# Patient Record
Sex: MALE | Race: WHITE | Employment: OTHER | URBAN - NONMETROPOLITAN AREA
[De-identification: names, ages, dates, MRNs, and addresses within clinical notes are randomized per-mention and may not be internally consistent; named-entity substitution may affect disease eponyms.]

---

## 2017-09-19 LAB — PSA, ULTRASENSITIVE: 0.04 NG/ML

## 2017-09-28 ENCOUNTER — OFFICE VISIT (OUTPATIENT)
Dept: UROLOGY | Age: 72
End: 2017-09-28
Payer: MEDICARE

## 2017-09-28 VITALS
HEIGHT: 74 IN | BODY MASS INDEX: 23.1 KG/M2 | HEART RATE: 63 BPM | DIASTOLIC BLOOD PRESSURE: 90 MMHG | WEIGHT: 180 LBS | SYSTOLIC BLOOD PRESSURE: 177 MMHG

## 2017-09-28 DIAGNOSIS — C61 PROSTATE CANCER (HCC): Primary | ICD-10-CM

## 2017-09-28 DIAGNOSIS — R31.29 MICROSCOPIC HEMATURIA: ICD-10-CM

## 2017-09-28 LAB
BACTERIA: ABNORMAL /HPF
BILIRUBIN URINE: NEGATIVE
BILIRUBIN URINE: NEGATIVE
BLOOD URINE, POC: NORMAL
BLOOD, URINE: NEGATIVE
CASTS 2: ABNORMAL /LPF
CASTS UA: ABNORMAL /LPF
CHARACTER, URINE: ABNORMAL
CHARACTER, URINE: CLEAR
COLOR, URINE: YELLOW
COLOR: YELLOW
CRYSTALS, UA: ABNORMAL
EPITHELIAL CELLS, UA: ABNORMAL /HPF
GLUCOSE URINE: NEGATIVE MG/DL
GLUCOSE URINE: NEGATIVE MG/DL
KETONES, URINE: NEGATIVE
KETONES, URINE: NEGATIVE
LEUKOCYTE CLUMPS, URINE: NEGATIVE
LEUKOCYTE ESTERASE, URINE: NEGATIVE
MISCELLANEOUS 2: ABNORMAL
NITRITE, URINE: NEGATIVE
NITRITE, URINE: NEGATIVE
PH UA: 7.5
PH, URINE: 7.5
PROTEIN UA: NEGATIVE
PROTEIN, URINE: NEGATIVE MG/DL
RBC URINE: ABNORMAL /HPF
RENAL EPITHELIAL, UA: ABNORMAL
SPECIFIC GRAVITY, URINE: 1.02 (ref 1–1.03)
SPECIFIC GRAVITY, URINE: 1.02 (ref 1–1.03)
UROBILINOGEN, URINE: 0.2 EU/DL
UROBILINOGEN, URINE: 0.2 EU/DL
WBC UA: ABNORMAL /HPF
YEAST: ABNORMAL

## 2017-09-28 PROCEDURE — 3017F COLORECTAL CA SCREEN DOC REV: CPT | Performed by: PHYSICIAN ASSISTANT

## 2017-09-28 PROCEDURE — G8428 CUR MEDS NOT DOCUMENT: HCPCS | Performed by: PHYSICIAN ASSISTANT

## 2017-09-28 PROCEDURE — 81003 URINALYSIS AUTO W/O SCOPE: CPT | Performed by: PHYSICIAN ASSISTANT

## 2017-09-28 PROCEDURE — 4040F PNEUMOC VAC/ADMIN/RCVD: CPT | Performed by: PHYSICIAN ASSISTANT

## 2017-09-28 PROCEDURE — 99213 OFFICE O/P EST LOW 20 MIN: CPT | Performed by: PHYSICIAN ASSISTANT

## 2017-09-28 PROCEDURE — G8420 CALC BMI NORM PARAMETERS: HCPCS | Performed by: PHYSICIAN ASSISTANT

## 2017-09-28 PROCEDURE — 1036F TOBACCO NON-USER: CPT | Performed by: PHYSICIAN ASSISTANT

## 2017-09-28 PROCEDURE — 1123F ACP DISCUSS/DSCN MKR DOCD: CPT | Performed by: PHYSICIAN ASSISTANT

## 2017-10-04 ENCOUNTER — TELEPHONE (OUTPATIENT)
Dept: UROLOGY | Age: 72
End: 2017-10-04

## 2017-10-04 NOTE — TELEPHONE ENCOUNTER
Spoke to . Mandeep Pennie regarding atypical cells on recent urine cytology. We discussed the following work-up:    1. Office cystoscopy with Dr. Mark Lee  2. CT urogram to rule out upper tract pathology  3. Bladder Cx    Patient would like to discuss these options with his wife. He was instructed to call the office back and leave a message on how he would like to proceed. If he would like to go over these options once again, I would be more than happy to call him back to discuss this evaluation further.

## 2017-10-05 ENCOUNTER — TELEPHONE (OUTPATIENT)
Dept: UROLOGY | Age: 72
End: 2017-10-05

## 2017-10-05 DIAGNOSIS — R82.89 ABNORMAL URINE CYTOLOGY: Primary | ICD-10-CM

## 2017-10-06 DIAGNOSIS — R82.89 ABNORMAL URINE CYTOLOGY: Primary | ICD-10-CM

## 2017-10-06 NOTE — TELEPHONE ENCOUNTER
Ric was notified of the following appointments: 10/10/17 0930 nurse visit for bladder cx, [facility] outpatient express for CT urogram.  He was advised on NPO 4 hours prior. Then with Dr Julia Knox for office cysto 10/19/17 0830. Ric voiced understanding of all instructions.

## 2017-10-10 ENCOUNTER — NURSE ONLY (OUTPATIENT)
Dept: UROLOGY | Age: 72
End: 2017-10-10

## 2017-10-10 ENCOUNTER — HOSPITAL ENCOUNTER (OUTPATIENT)
Dept: CT IMAGING | Age: 72
Discharge: HOME | End: 2017-10-10
Payer: MEDICARE

## 2017-10-10 DIAGNOSIS — R82.89 ABNORMAL URINE CYTOLOGY: ICD-10-CM

## 2017-10-10 DIAGNOSIS — R89.6 ABNORMAL CYTOLOGY: Primary | ICD-10-CM

## 2017-10-10 LAB — POC CREATININE WHOLE BLOOD: 0.8 MG/DL (ref 0.5–1.2)

## 2017-10-10 PROCEDURE — 6360000004 HC RX CONTRAST MEDICATION: Performed by: PHYSICIAN ASSISTANT

## 2017-10-10 PROCEDURE — 82565 ASSAY OF CREATININE: CPT

## 2017-10-10 PROCEDURE — 74178 CT ABD&PLV WO CNTR FLWD CNTR: CPT

## 2017-10-10 PROCEDURE — 99999 PR OFFICE/OUTPT VISIT,PROCEDURE ONLY: CPT | Performed by: NURSE PRACTITIONER

## 2017-10-10 RX ADMIN — IOPAMIDOL 85 ML: 755 INJECTION, SOLUTION INTRAVENOUS at 10:57

## 2017-10-10 NOTE — PROGRESS NOTES
Patient presents today to collect a urine sample to sendout for the Bladder Cx per [facility].

## 2017-10-10 NOTE — PROGRESS NOTES
I have personally verified, reviewed, released, signed, authenticated, authorized, confirmed,finalized, and approved the actions of the CMA.

## 2017-10-19 ENCOUNTER — PROCEDURE VISIT (OUTPATIENT)
Dept: UROLOGY | Age: 72
End: 2017-10-19
Payer: MEDICARE

## 2017-10-19 VITALS — DIASTOLIC BLOOD PRESSURE: 90 MMHG | SYSTOLIC BLOOD PRESSURE: 154 MMHG | WEIGHT: 197 LBS | BODY MASS INDEX: 25.29 KG/M2

## 2017-10-19 DIAGNOSIS — R31.9 HEMATURIA, UNSPECIFIED TYPE: Primary | ICD-10-CM

## 2017-10-19 LAB
BILIRUBIN URINE: NEGATIVE
BLOOD URINE, POC: NORMAL
CHARACTER, URINE: CLEAR
COLOR, URINE: YELLOW
GLUCOSE URINE: NEGATIVE MG/DL
KETONES, URINE: NEGATIVE
LEUKOCYTE CLUMPS, URINE: NEGATIVE
NITRITE, URINE: NEGATIVE
PH, URINE: 8.5
PROTEIN, URINE: NEGATIVE MG/DL
SPECIFIC GRAVITY, URINE: 1.02 (ref 1–1.03)
UROBILINOGEN, URINE: 0.2 EU/DL

## 2017-10-19 PROCEDURE — 81003 URINALYSIS AUTO W/O SCOPE: CPT | Performed by: UROLOGY

## 2017-10-19 PROCEDURE — 99999 PR OFFICE/OUTPT VISIT,PROCEDURE ONLY: CPT | Performed by: UROLOGY

## 2017-10-19 PROCEDURE — 52000 CYSTOURETHROSCOPY: CPT | Performed by: UROLOGY

## 2017-10-19 NOTE — PROGRESS NOTES
Mr. Alber Jennings was seen in follow up for cystoscopy as per plan developed by RASHIDA Kennedy. Cystoscopy was performed without difficulty and it was well tolerated. Past Medical History:   Diagnosis Date    Arthritis     ankles and knees    Cancer [facility])     prostate    Enlarged prostate     Epididymal cyst 2013    Hyperlipidemia     Prostate nodule        Past Surgical History:   Procedure Laterality Date    ABDOMEN SURGERY      CATARACT REMOVAL      COLONOSCOPY      EYE SURGERY      detatched retina repaired right side;bilateral cateracts    PROSTATE SURGERY  09/14/2012 Dr Prosper Espinal       Current Outpatient Prescriptions on File Prior to Visit   Medication Sig Dispense Refill    latanoprost (XALATAN) 0.005 % ophthalmic solution 1 drop nightly      Lutein 6 MG CAPS Take by mouth      Vitamin D (CHOLECALCIFEROL) 1000 UNITS CAPS capsule Take 1,000 Units by mouth daily      simvastatin (ZOCOR) 20 MG tablet Take 20 mg by mouth nightly. No current facility-administered medications on file prior to visit. No Known Allergies    Family History   Problem Relation Age of Onset    Heart Disease Mother     Heart Disease Father     Stroke Father     Prostate Cancer Maternal Grandfather 80       Social History     Social History    Marital status:      Spouse name: N/A    Number of children: N/A    Years of education: N/A     Occupational History    Not on file.      Social History Main Topics    Smoking status: Former Smoker     Years: 4.00     Types: Cigarettes     Quit date: 9/1/1968    Smokeless tobacco: Never Used      Comment: quit in 1968    Alcohol use No    Drug use: No    Sexual activity: Not on file     Other Topics Concern    Not on file     Social History Narrative    No narrative on file       Review of Systems  No problems with ears, nose or throat. No problems with eyes. No chest pain, shortness of breath, abdominal pain, extremity pain or weakness, and no neurological deficits. No rashes. No swollen glands or lymph nodes.  symptoms per HPI. The remainder of the review of symptoms is negative. Exam  General: alert and oriented. Cooperative. HENT: Normocephalic, Atraumatic. Eyes: No scleral icterus, mucous membranes moist.  Respiratory: Effort normal.   Skin: No rashes or obvious lesions. Labs    Results for POC orders placed in visit on 10/19/17   POCT Urinalysis No Micro (Auto)   Result Value Ref Range    Glucose, Ur Negative NEGATIVE mg/dl    Bilirubin Urine Negative     Ketones, Urine Negative NEGATIVE    Specific Gravity, Urine 1.020 1.002 - 1.03    Blood, UA POC Small NEGATIVE    pH, Urine 8.50 5.0 - 9.0    Protein, Urine Negative NEGATIVE mg/dl    Urobilinogen, Urine 0.20 0.0 - 1.0 eu/dl    Nitrite, Urine Negative NEGATIVE    Leukocyte Clumps, Urine Negative NEGATIVE    Color, Urine Yellow YELLOW-STR    Character, Urine Clear [id number].[email or web address]       Lab Results   Component Value Date    CREATININE 0.9 07/06/2013    BUN 9 07/06/2013     07/06/2013    K 4.0 07/06/2013     07/06/2013    CO2 30 07/06/2013       Lab Results   Component Value Date    PSA <0.02 09/23/2015       PATHOLOGY REPORT                      ATTN: DONNA BEASLEY                      REQ: DONNA BEASLEY        Clinical Information:  MICROSCOPIC HEMATURIA    SOURCE:  A) URINE, METHOD OF COLLECTION UNKNOWN    Source Description:                 Materials Prepared & Examined:  Volume. ............ 30 ml           Monolayers. ............... Nissa Bending 1  Color. .......... Yellow  Consistency. .. Nissa Bending [place] Bending Nissa Bending Thin  Other. ................ Cloudy        FINAL RESULTS:     Atypical urothelial cells present.       Blood. Radiology  The patient has had a CT urogram that I have reviewed along with its accompanying report.   The study demonstrates some kidney stones

## 2018-01-18 ENCOUNTER — HOSPITAL ENCOUNTER (OUTPATIENT)
Age: 73
Discharge: HOME | End: 2018-01-18
Payer: MEDICARE

## 2018-01-18 DIAGNOSIS — R31.9 HEMATURIA, UNSPECIFIED TYPE: ICD-10-CM

## 2018-01-18 PROCEDURE — 88112 CYTOPATH CELL ENHANCE TECH: CPT

## 2018-09-18 LAB — PSA, ULTRASENSITIVE: <0.014 NG/ML

## 2018-09-27 ENCOUNTER — OFFICE VISIT (OUTPATIENT)
Dept: UROLOGY | Age: 73
End: 2018-09-27
Payer: MEDICARE

## 2018-09-27 VITALS
WEIGHT: 193 LBS | BODY MASS INDEX: 24.77 KG/M2 | HEIGHT: 74 IN | DIASTOLIC BLOOD PRESSURE: 88 MMHG | SYSTOLIC BLOOD PRESSURE: 138 MMHG

## 2018-09-27 DIAGNOSIS — C61 PROSTATE CANCER (HCC): Primary | ICD-10-CM

## 2018-09-27 DIAGNOSIS — N20.0 NEPHROLITHIASIS: ICD-10-CM

## 2018-09-27 PROCEDURE — 3017F COLORECTAL CA SCREEN DOC REV: CPT | Performed by: PHYSICIAN ASSISTANT

## 2018-09-27 PROCEDURE — 1123F ACP DISCUSS/DSCN MKR DOCD: CPT | Performed by: PHYSICIAN ASSISTANT

## 2018-09-27 PROCEDURE — 4040F PNEUMOC VAC/ADMIN/RCVD: CPT | Performed by: PHYSICIAN ASSISTANT

## 2018-09-27 PROCEDURE — G8427 DOCREV CUR MEDS BY ELIG CLIN: HCPCS | Performed by: PHYSICIAN ASSISTANT

## 2018-09-27 PROCEDURE — 99213 OFFICE O/P EST LOW 20 MIN: CPT | Performed by: PHYSICIAN ASSISTANT

## 2018-09-27 PROCEDURE — 1101F PT FALLS ASSESS-DOCD LE1/YR: CPT | Performed by: PHYSICIAN ASSISTANT

## 2018-09-27 PROCEDURE — 1036F TOBACCO NON-USER: CPT | Performed by: PHYSICIAN ASSISTANT

## 2018-09-27 PROCEDURE — G8420 CALC BMI NORM PARAMETERS: HCPCS | Performed by: PHYSICIAN ASSISTANT

## 2018-09-27 PROCEDURE — 81003 URINALYSIS AUTO W/O SCOPE: CPT | Performed by: PHYSICIAN ASSISTANT

## 2018-09-27 RX ORDER — LORATADINE 10 MG/1
10 CAPSULE, LIQUID FILLED ORAL PRN
COMMUNITY
End: 2020-10-12

## 2018-09-28 ENCOUNTER — TELEPHONE (OUTPATIENT)
Dept: UROLOGY | Age: 73
End: 2018-09-28

## 2018-09-28 DIAGNOSIS — R31.29 MICROSCOPIC HEMATURIA: ICD-10-CM

## 2018-09-28 DIAGNOSIS — R89.6 ABNORMAL CYTOLOGY: Primary | ICD-10-CM

## 2018-09-28 NOTE — PROGRESS NOTES
I have reviewed the patients chart and Hardy Medina has discussed relevant portions with me. I agree with the assessment and plan.

## 2018-10-05 ENCOUNTER — TELEPHONE (OUTPATIENT)
Dept: UROLOGY | Age: 73
End: 2018-10-05

## 2018-10-10 NOTE — TELEPHONE ENCOUNTER
Patient called asking the results of his testing-I relayed Tanvi's message and asked if I could schedule him for an appointment with Dr Symone Dunlap. He declined and said he would get back to us on this. He said he had a cystoscopy last year, and I told him that it would still be a good idea to have it checked out with his family history and all. He still said he would get back with us.

## 2019-09-24 LAB — PROSTATE SPECIFIC ANTIGEN: NORMAL NG/ML

## 2019-10-03 ENCOUNTER — OFFICE VISIT (OUTPATIENT)
Dept: UROLOGY | Age: 74
End: 2019-10-03
Payer: MEDICARE

## 2019-10-03 ENCOUNTER — TELEPHONE (OUTPATIENT)
Dept: UROLOGY | Age: 74
End: 2019-10-03

## 2019-10-03 VITALS
DIASTOLIC BLOOD PRESSURE: 82 MMHG | HEIGHT: 72 IN | WEIGHT: 187.9 LBS | SYSTOLIC BLOOD PRESSURE: 138 MMHG | BODY MASS INDEX: 25.45 KG/M2

## 2019-10-03 DIAGNOSIS — R89.6 ABNORMAL CYTOLOGY: ICD-10-CM

## 2019-10-03 DIAGNOSIS — C61 PROSTATE CANCER (HCC): Primary | ICD-10-CM

## 2019-10-03 DIAGNOSIS — R31.29 MICROSCOPIC HEMATURIA: ICD-10-CM

## 2019-10-03 LAB
BACTERIA: ABNORMAL /HPF
BILIRUBIN URINE: NEGATIVE
BILIRUBIN URINE: NEGATIVE
BLOOD URINE, POC: ABNORMAL
BLOOD, URINE: ABNORMAL
CASTS 2: ABNORMAL /LPF
CASTS UA: ABNORMAL /LPF
CHARACTER, URINE: CLEAR
CHARACTER, URINE: CLEAR
COLOR, URINE: YELLOW
COLOR: YELLOW
CRYSTALS, UA: ABNORMAL
EPITHELIAL CELLS, UA: ABNORMAL /HPF
GLUCOSE URINE: NEGATIVE MG/DL
GLUCOSE URINE: NEGATIVE MG/DL
KETONES, URINE: NEGATIVE
KETONES, URINE: NEGATIVE
LEUKOCYTE CLUMPS, URINE: NEGATIVE
LEUKOCYTE ESTERASE, URINE: NEGATIVE
MISCELLANEOUS 2: ABNORMAL
NITRITE, URINE: NEGATIVE
NITRITE, URINE: NEGATIVE
PH UA: 5.5 (ref 5–9)
PH, URINE: 6 (ref 5–9)
PROTEIN UA: NEGATIVE
PROTEIN, URINE: NEGATIVE MG/DL
RBC URINE: ABNORMAL /HPF
RENAL EPITHELIAL, UA: ABNORMAL
SPECIFIC GRAVITY, URINE: 1.02 (ref 1–1.03)
SPECIFIC GRAVITY, URINE: 1.02 (ref 1–1.03)
UROBILINOGEN, URINE: 0.2 EU/DL (ref 0–1)
UROBILINOGEN, URINE: 0.2 EU/DL (ref 0–1)
WBC UA: ABNORMAL /HPF
YEAST: ABNORMAL

## 2019-10-03 PROCEDURE — 4040F PNEUMOC VAC/ADMIN/RCVD: CPT | Performed by: PHYSICIAN ASSISTANT

## 2019-10-03 PROCEDURE — G8484 FLU IMMUNIZE NO ADMIN: HCPCS | Performed by: PHYSICIAN ASSISTANT

## 2019-10-03 PROCEDURE — 81003 URINALYSIS AUTO W/O SCOPE: CPT | Performed by: PHYSICIAN ASSISTANT

## 2019-10-03 PROCEDURE — G8419 CALC BMI OUT NRM PARAM NOF/U: HCPCS | Performed by: PHYSICIAN ASSISTANT

## 2019-10-03 PROCEDURE — 99213 OFFICE O/P EST LOW 20 MIN: CPT | Performed by: PHYSICIAN ASSISTANT

## 2019-10-03 PROCEDURE — 1036F TOBACCO NON-USER: CPT | Performed by: PHYSICIAN ASSISTANT

## 2019-10-03 PROCEDURE — G8427 DOCREV CUR MEDS BY ELIG CLIN: HCPCS | Performed by: PHYSICIAN ASSISTANT

## 2019-10-03 PROCEDURE — 1123F ACP DISCUSS/DSCN MKR DOCD: CPT | Performed by: PHYSICIAN ASSISTANT

## 2019-10-03 RX ORDER — LISINOPRIL 10 MG/1
TABLET ORAL
COMMUNITY
Start: 2019-08-07

## 2019-10-15 ENCOUNTER — TELEPHONE (OUTPATIENT)
Dept: UROLOGY | Age: 74
End: 2019-10-15

## 2019-10-15 ENCOUNTER — NURSE ONLY (OUTPATIENT)
Dept: UROLOGY | Age: 74
End: 2019-10-15

## 2019-10-30 ENCOUNTER — TELEPHONE (OUTPATIENT)
Dept: UROLOGY | Age: 74
End: 2019-10-30

## 2020-09-24 LAB — PROSTATE SPECIFIC ANTIGEN: NORMAL

## 2020-10-11 NOTE — PROGRESS NOTES
Mr. Jane Chaudhry is a 58-year-old male status post RALRP with Right Pelvic Lymph Node Dissection on on 9/14/12. His pathology was significant for Alston's 3+3=6 Stage [id number] Prostate Cancer. He has rare episodes of scant stress incontinence but no voiding difficulties, gross hematuria, or dysuria. He performs daily pelvic floor therapy. He did have atypical cells on urine cytology in 9/17. His Bladder Cx revealed a mildly elevated level of 0.15. He underwent office cystoscopy with Dr. Alli Jade in October 2017. No abnormal findings were demonstrated. CT scan (9/17) revealed bilateral small stones. The distal most part of the right ureter was not visualized. Otherwise, he denies bone pain, weight loss or constitutional symptoms. His PSA levels have consistantly ranged from 0.01-0.05. He denies any general medical concerns such as chest pain, shortness of breath, N/V, calf pain, HA, lightheadedness or fever/chills.          Past Medical History:   Diagnosis Date    Arthritis     ankles and knees    Cancer [facility])     prostate    Enlarged prostate     Epididymal cyst 2013    Hyperlipidemia     Prostate nodule        Past Surgical History:   Procedure Laterality Date    ABDOMEN SURGERY      CATARACT REMOVAL      COLONOSCOPY      EYE SURGERY      detatched retina repaired right side;bilateral cateracts    KNEE SURGERY Right 07/2017    Torn Meniscus     PROSTATE SURGERY  09/14/2012 Dr Radha Torres       Current Outpatient Medications on File Prior to Visit   Medication Sig Dispense Refill    lisinopril (PRINIVIL;ZESTRIL) 10 MG tablet       latanoprost (XALATAN) 0.005 % ophthalmic solution 1 drop nightly      Lutein 6 MG CAPS Take by mouth      Vitamin D (CHOLECALCIFEROL) 1000 UNITS CAPS capsule Take 1,000 Units by mouth daily      simvastatin (ZOCOR) 20 MG tablet Take 20 mg by mouth nightly. No current facility-administered medications on file prior to visit. No Known Allergies    Family History   Problem Relation Age of Onset    Heart Disease Mother     Heart Disease Father     Stroke Father     Prostate Cancer Maternal Grandfather 80       Social History     Socioeconomic History    Marital status:      Spouse name: Not on file    Number of children: Not on file    Years of education: Not on file    Highest education level: Not on file   Occupational History    Not on file   Social Needs    Financial resource strain: Not on file    Food insecurity     Worry: Not on file     Inability: Not on file    Transportation needs     Medical: Not on file     Non-medical: Not on file   Tobacco Use    Smoking status: Former Smoker     Packs/day: 1.00     Years: 4.00     Pack years: 4.00     Types: Cigarettes     Last attempt to quit: 1968     Years since quittin.1    Smokeless tobacco: Never Used    Tobacco comment: quit in    Substance and Sexual Activity    Alcohol use: No    Drug use: No    Sexual activity: Not on file   Lifestyle    Physical activity     Days per week: Not on file     Minutes per session: Not on file    Stress: Not on file   Relationships    Social connections     Talks on phone: Not on file     Gets together: Not on file     Attends Buddhist service: Not on file     Active member of club or organization: Not on file     Attends meetings of clubs or organizations: Not on file     Relationship status: Not on file    Intimate partner violence     Fear of current or ex partner: Not on file     Emotionally abused: Not on file     Physically abused: Not on file     Forced sexual activity: Not on file   Other Topics Concern    Not on file   Social History Narrative    Not on file       Review of Systems  No problems with ears, nose or throat. No problems with eyes.   No chest pain, shortness of breath, abdominal pain, extremity pain or weakness, and no neurological deficits. No rashes. No swollen glands or lymph nodes.  symptoms per HPI. The remainder of the review of symptoms is negative. Exam    Temp 97.2 °F (36.2 °C) (Temporal)   Ht 6' (1.829 m)   Wt 182 lb (82.6 kg)   BMI 24.68 kg/m²     Constitutional: Oriented to person, place, and time. Vital signs are normal. Appears well-developed and well-nourished. Cooperative. No distress. HENT:    Head: Normocephalic and atraumatic.    Eyes: EOM are normal. Pupils are equal, round, and reactive to light. Right eye exhibits no discharge. Left eye exhibits no discharge. No scleral icterus. Neck: Trachea normal.    Cardiovascular: Normal rate and regular rhythm. S1 and S2 normal.  Pulmonary/Chest: Effort normal. Clear bilaterally without rales, rhonchi, wheezes. No respiratory distress. Abdominal: Soft. Exhibits no distension or tenderness. There is no rebound and no CVA tenderness. Musculoskeletal: No edema or tenderness. Lymphadenopathy: No supraclavicular or superficial cervical lymphadenopathy noted. Neurological: Alert and oriented to person, place, and time. No cranial nerve deficit. Skin: Skin is warm and dry. Not diaphoretic. Psychiatric: Normal mood and affect. Behavior is normal.   Nursing note and vitals reviewed.       Labs    Results for POC orders placed in visit on 10/12/20   POCT Urinalysis No Micro (Auto)   Result Value Ref Range    Glucose, Ur Negative NEGATIVE mg/dl    Bilirubin Urine Negative     Ketones, Urine Negative NEGATIVE    Specific Gravity, Urine 1.020 1.002 - 1.030    Blood, UA POC Moderate (A) NEGATIVE    pH, Urine 6.00 5.0 - 9.0    Protein, Urine Negative NEGATIVE mg/dl    Urobilinogen, Urine 0.20 0.0 - 1.0 eu/dl    Nitrite, Urine Negative NEGATIVE    Leukocyte Clumps, Urine Negative NEGATIVE    Color, Urine Yellow YELLOW-STRAW    Character, Urine Clear CLR-SL.CLOUD       Lab Results   Component Value Date    CREATININE 0.9 07/06/2013 BUN 9 07/06/2013     07/06/2013    K 4.0 07/06/2013     07/06/2013    CO2 30 07/06/2013       Lab Results   Component Value Date    PSA  09/24/2020      Comment:      <0.01    PSA  09/24/2019      Comment:      <0.01    PSA <0.02 09/23/2015         Plan:  1. Prostate cancer- Status post RALRP with Right Pelvic Lymph Node Dissection on on 9/14/12. His pathology was significant for Lamar's 3+3=6 Stage [id number] Prostate Cancer. PSA levels have ranged from 0.01-0.05. His most recent PSA in September 2020 was <0.01. Will continue with yearly PSA levels. Call with any concerns in the meantime.     2. Microscopic hematuria/History of abnormal urine cytology and elevated Bladder Cx- Urine cytology 9/17 was positive for atypical cells. His Bladder Cx (10/17) revealed a mildly elevated level of 0.15. He underwent office cystoscopy with Dr. Janey Carr in October 2017. No abnormal findings were demonstrated. CT scan (9/17) revealed bilateral small stones. The distal most part of the right ureter was not visualized. Urine cytology (January 2018) was negative. Patient reports a family history of bladder cancer (great uncles and great grandfather). Bladder Cx testing in October 2018 and October 2019 was negative at 0.10 and 0.03, respectively. Will recheck urine cytology. IF this is elevated, cystoscopy with possible retrograde studies and bilateral ureteroscopy will be considered.      3. Nephrolithiasis- Left renal and right renal, non-obstructing stones per CT dated 9/2017. Asymptomatic. May be source of microscopic hematuria. Patient has not obtained Litholink to date. Order has given.

## 2020-10-12 ENCOUNTER — OFFICE VISIT (OUTPATIENT)
Dept: UROLOGY | Age: 75
End: 2020-10-12
Payer: MEDICARE

## 2020-10-12 VITALS — WEIGHT: 182 LBS | HEIGHT: 72 IN | BODY MASS INDEX: 24.65 KG/M2 | TEMPERATURE: 97.2 F

## 2020-10-12 LAB
BILIRUBIN URINE: NEGATIVE
BLOOD URINE, POC: ABNORMAL
CHARACTER, URINE: CLEAR
COLOR, URINE: YELLOW
GLUCOSE URINE: NEGATIVE MG/DL
KETONES, URINE: NEGATIVE
LEUKOCYTE CLUMPS, URINE: NEGATIVE
NITRITE, URINE: NEGATIVE
PH, URINE: 6 (ref 5–9)
PROTEIN, URINE: NEGATIVE MG/DL
SPECIFIC GRAVITY, URINE: 1.02 (ref 1–1.03)
UROBILINOGEN, URINE: 0.2 EU/DL (ref 0–1)

## 2020-10-12 PROCEDURE — 81003 URINALYSIS AUTO W/O SCOPE: CPT | Performed by: PHYSICIAN ASSISTANT

## 2020-10-12 PROCEDURE — 99213 OFFICE O/P EST LOW 20 MIN: CPT | Performed by: PHYSICIAN ASSISTANT

## 2020-10-22 ENCOUNTER — TELEPHONE (OUTPATIENT)
Dept: UROLOGY | Age: 75
End: 2020-10-22

## 2020-10-22 NOTE — TELEPHONE ENCOUNTER
Left message for Mckenzie Popeye Rivas at his workplace ([phone]) to call office back and leave a message on when would be the best time to reach him in regards to a urine study that we performed.

## 2020-10-22 NOTE — TELEPHONE ENCOUNTER
Patient called back and said to call [phone] from 1 to 3 best time at work or at home after 3:15 [phone]

## 2020-10-23 ENCOUNTER — TELEPHONE (OUTPATIENT)
Dept: UROLOGY | Age: 75
End: 2020-10-23

## 2020-10-23 NOTE — TELEPHONE ENCOUNTER
Called patient's work phone and home phone with no answer. Patient has a history of abnormal cells found on urine cytology in September 2017. CT urogram was negative. He underwent an office cystoscopy with  and there were no abnormal findings. He has received yearly Bladder Cx testing, which has been normal. His most recent urine cytology obtained at his last office visit was significant for atypical cells. Sometimes these abnormal cells are related to inflammation but we really need to rule out bladder cancer as well. It is highly recommended that we schedule him with an office cystoscopy with Dr. Kimberly Cuadra in the near future to take a look inside his bladder to rule out any bladder mass or lesion.

## 2020-10-23 NOTE — TELEPHONE ENCOUNTER
Patient called back this morning with a couple of more options for times. 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 Otherwise, he said since his wife is on his HIPAA, and she is at home all day, if you wish you could talk to her and give her the message.   He is OK with that also

## 2020-11-10 ENCOUNTER — PROCEDURE VISIT (OUTPATIENT)
Dept: UROLOGY | Age: 75
End: 2020-11-10
Payer: MEDICARE

## 2020-11-10 VITALS — BODY MASS INDEX: 24.81 KG/M2 | HEIGHT: 72 IN | WEIGHT: 183.2 LBS | TEMPERATURE: 98.1 F

## 2020-11-10 LAB
BILIRUBIN URINE: NEGATIVE
BLOOD URINE, POC: NORMAL
CHARACTER, URINE: CLEAR
COLOR, URINE: YELLOW
GLUCOSE URINE: NEGATIVE MG/DL
KETONES, URINE: NEGATIVE
LEUKOCYTE CLUMPS, URINE: NEGATIVE
NITRITE, URINE: NEGATIVE
PH, URINE: 7.5 (ref 5–9)
PROTEIN, URINE: NEGATIVE MG/DL
SPECIFIC GRAVITY, URINE: 1.01 (ref 1–1.03)
UROBILINOGEN, URINE: 0.2 EU/DL (ref 0–1)

## 2020-11-10 PROCEDURE — 99213 OFFICE O/P EST LOW 20 MIN: CPT | Performed by: UROLOGY

## 2020-11-10 PROCEDURE — 81003 URINALYSIS AUTO W/O SCOPE: CPT | Performed by: UROLOGY

## 2020-11-10 PROCEDURE — 52000 CYSTOURETHROSCOPY: CPT | Performed by: UROLOGY

## 2020-11-10 NOTE — PROGRESS NOTES
Gloria Pinto MD        [address] [facility]  [address] [address]  Dept: [phone]  Dept Fax: [phone]: [address] [address] Urology Office Note -     Patient:  Gustavo Shetty  YOB: 1945    The patient is a 66 y.o. male who presents today for evaluation of the following problems:   Chief Complaint   Patient presents with    Cystoscopy    Hematuria     micro    Prostate Cancer        HISTORY OF PRESENT ILLNESS:     hematuria  Onset was  Years ago  Overall, the problem(s) are unchanged. Severity is described as mild-moderate. Associated Symptoms: No dysuria, no gross hematuria. Current Pain Severity: 0    Here for cystoscopy      Summary of Previous Records:  Plan:  1. Prostate cancer- Status post RALRP with Right Pelvic Lymph Node Dissection on on 9/14/12. His pathology was significant for Joan's 3+3=6 Stage [id number] Prostate Cancer. PSA levels have ranged from 0.01-0.05. His most recent PSA in September 2020 was <0.01. Will continue with yearly PSA levels. Call with any concerns in the meantime.     2. Microscopic hematuria/History of abnormal urine cytology and elevated Bladder Cx- Urine cytology 9/17 was positive for atypical cells. His Bladder Cx (10/17) revealed a mildly elevated level of 0.15. He underwent office cystoscopy with Dr. Annalisa Pisano in October 2017. No abnormal findings were demonstrated. CT scan (9/17) revealed bilateral small stones. The distal most part of the right ureter was not visualized. Urine cytology (January 2018) was negative. Patient reports a family history of bladder cancer (great uncles and great grandfather). Bladder Cx testing in October 2018 and October 2019 was negative at 0.10 and 0.03, respectively. Will recheck urine cytology. IF this is elevated, cystoscopy with possible retrograde studies and bilateral ureteroscopy will be considered.      3.  Nephrolithiasis- Left renal and right renal, non-obstructing stones per CT dated 9/2017. Asymptomatic. May be source of microscopic hematuria. Patient has not obtained Litholink to date. Order has given. Requested/reviewed records from Sarai Fairchild DO office and/or outside physician/EMR    (Patient's old records have been requested, reviewed and pertinent findings summarized in today's note.)    Procedures Today:         Cystoscopy Operative Note    Patient:  Keith Egan  MRN: [id number]  YOB: 1945  Surgeon: Arvind Be MD  Anesthesia: Urethral 2% Xylocaine   Indications: hematuria  Position: Supine    Findings:   The patient was prepped and draped in the usual sterile fashion. The flexible cystoscope was advanced through the urethra and into the bladder. The bladder was thoroughly inspected and the following was noted:    Residual Urine: Minimal   Urethra: No abnormalities of the urethra are noted. Prostate: not present. Open bladder neck without bladder neck contracture. Bladder: No tumors or CIS noted. No bladder diverticulum. Mild  trabeculation noted. Ureters: Clear efflux from both ureters. Orifices with normal configuration and location. The cystoscope was removed. The patient tolerated the procedure well.       Last several PSA's:  Lab Results   Component Value Date    PSA  09/24/2020      Comment:      <0.01    PSA  09/24/2019      Comment:      <0.01    PSA <0.02 09/23/2015       Last total testosterone:  No results found for: TESTOSTERONE    Urinalysis today:  Results for POC orders placed in visit on 11/10/20   POCT Urinalysis No Micro (Auto)   Result Value Ref Range    Glucose, Ur Negative NEGATIVE mg/dl    Bilirubin Urine Negative     Ketones, Urine Negative NEGATIVE    Specific Gravity, Urine 1.015 1.002 - 1.030    Blood, UA POC Trace-intact NEGATIVE    pH, Urine 7.50 5.0 - 9.0    Protein, Urine Negative NEGATIVE mg/dl    Urobilinogen, Urine 0.20 0.0 - 1.0 eu/dl    Nitrite, Urine Negative NEGATIVE    Leukocyte Clumps, Urine Negative NEGATIVE    Color, Urine Yellow YELLOW-STRAW    Character, Urine Clear CLR-SL.CLOUD       Last BUN and creatinine:  Lab Results   Component Value Date    BUN 9 07/06/2013     Lab Results   Component Value Date    CREATININE 0.9 07/06/2013       Imaging Reviewed during this Office Visit:   Heather Ojeda MD independently reviewed the images and verified the radiology reports from:    No results found. PAST MEDICAL, FAMILY AND SOCIAL HISTORY:  Past Medical History:   Diagnosis Date    Arthritis     ankles and knees    Cancer ([address]     prostate    Enlarged prostate     Epididymal cyst 2013    Hyperlipidemia     Prostate nodule      Past Surgical History:   Procedure Laterality Date    ABDOMEN SURGERY      CATARACT REMOVAL      COLONOSCOPY      EYE SURGERY      detatched retina repaired right side;bilateral cateracts    KNEE SURGERY Right 07/2017    Torn Meniscus     PROSTATE SURGERY  09/14/2012 Dr Cervantes Phi     Family History   Problem Relation Age of Onset    Heart Disease Mother     Heart Disease Father     Stroke Father     Prostate Cancer Maternal Grandfather 90     Outpatient Medications Marked as Taking for the 11/10/20 encounter (Procedure visit) with Jim Quan MD   Medication Sig Dispense Refill    lisinopril (PRINIVIL;ZESTRIL) 10 MG tablet       latanoprost (XALATAN) 0.005 % ophthalmic solution 1 drop nightly      Lutein 6 MG CAPS Take by mouth      Vitamin D (CHOLECALCIFEROL) 1000 UNITS CAPS capsule Take 1,000 Units by mouth daily      simvastatin (ZOCOR) 20 MG tablet Take 20 mg by mouth nightly. Patient has no known allergies.   Social History     Tobacco Use   Smoking Status Former Smoker    Packs/day: 1.00    Years: 4.00    Pack years: 4.00    Types: Cigarettes    Last attempt to quit:

## 2020-11-12 ENCOUNTER — TELEPHONE (OUTPATIENT)
Dept: UROLOGY | Age: 75
End: 2020-11-12

## 2020-11-12 NOTE — TELEPHONE ENCOUNTER
Patient scheduled for CT Urogram [facility] MR on 11/21/20 @ 10:10 am arrival time for a 10:40 am scan time. Follow up made as a telephone call with Dev Joseph on 11/23/20.  Patient does not want to come in for the results due to Covid

## 2020-11-13 ENCOUNTER — TELEPHONE (OUTPATIENT)
Dept: UROLOGY | Age: 75
End: 2020-11-13

## 2020-11-13 NOTE — TELEPHONE ENCOUNTER
Mr. Micaela Cohen best contact number is [phone]. He will be at this number on 11/23 and 11/24 from [phone].

## 2020-11-20 NOTE — PROGRESS NOTES
TeleHealth Evaluation During [address] Emergency    Participants:   Provider: Angel Cruz PA-C (office)  Patient: Mr. Nathaniel Chery (work)    Mr. Kristen Canales is a 25-year-old male status post RALRP with Right Pelvic Lymph Node Dissection on on 9/14/12. His pathology was significant for Paris's 3+3=6 Stage [id number] Prostate Cancer. His PSA levels have consistantly ranged from 0.01-0.05. He has rare episodes of scant stress incontinence but no voiding difficulties, gross hematuria, or dysuria. He performs pelvic floor exercises. He does have a history of atypical cells. This was first detected in 2017. His Bladder Cx revealed a mildly elevated level of 0.15. He underwent office cystoscopy with Dr. Yesi Medina in October 2017. No abnormal findings were demonstrated. CT scan (9/17) revealed bilateral small stones. The distal most part of the right ureter was not visualized. Urine cytology (January 2018) was negative. Patient reports a family history of bladder cancer (great uncles and great grandfather). Bladder Cx testing in October 2018 and October 2019 was negative at 0.10 and 0.03, respectively. His cytology in October 2020 was significant for atypical cells, high grade urothelial carcinoma. He underwent office cystoscopy with Dr. Kesha Escalante on 11/10/20. No abnormalities were visualized. His visit today is to review his CT urogram.       Otherwise, he denies bone pain, weight loss or constitutional symptoms.   He denies any general medical concerns such as chest pain, shortness of breath, N/V, calf pain, HA, lightheadedness or fever/chills.       Past Medical History:   Diagnosis Date    Arthritis     ankles and knees    Cancer ([address]     prostate    Enlarged prostate     Epididymal cyst 2013    Hyperlipidemia     Prostate nodule        Past Surgical History:   Procedure Laterality Date    ABDOMEN SURGERY      CATARACT REMOVAL      COLONOSCOPY      EYE SURGERY      detatched retina repaired right side;bilateral cateracts    KNEE SURGERY Right 2017    Torn Meniscus     PROSTATE SURGERY  2012 Dr Antwan Huntley       Current Outpatient Medications on File Prior to Visit   Medication Sig Dispense Refill    lisinopril (PRINIVIL;ZESTRIL) 10 MG tablet       latanoprost (XALATAN) 0.005 % ophthalmic solution 1 drop nightly      Lutein 6 MG CAPS Take by mouth      Vitamin D (CHOLECALCIFEROL) 1000 UNITS CAPS capsule Take 2,000 Units by mouth daily       simvastatin (ZOCOR) 20 MG tablet Take 20 mg by mouth nightly. No current facility-administered medications on file prior to visit.         No Known Allergies    Family History   Problem Relation Age of Onset    Heart Disease Mother     Heart Disease Father     Stroke Father     Prostate Cancer Maternal Grandfather 80       Social History     Socioeconomic History    Marital status:      Spouse name: Not on file    Number of children: Not on file    Years of education: Not on file    Highest education level: Not on file   Occupational History    Not on file   Social Needs    Financial resource strain: Not on file    Food insecurity     Worry: Not on file     Inability: Not on file   [employer] needs     Medical: Not on file     Non-medical: Not on file   Tobacco Use    Smoking status: Former Smoker     Packs/day: 1.00     Years: 4.00     Pack years: 4.00     Types: Cigarettes     Last attempt to quit: 1968     Years since quittin.2    Smokeless tobacco: Never Used    Tobacco comment: quit in    Substance and Sexual Activity    Alcohol use: No    Drug use: No    Sexual activity: Not on file   Lifestyle    Physical activity     Days per week: Not on file     Minutes per session: Not on file    Stress: Not on file   Relationships    Social connections     Talks on phone: Not on file October 2019 was negative at 0.10 and 0.03, respectively. His cytology in October 2020 was significant for atypical cells, high grade urothelial carcinoma. He underwent office cystoscopy with Dr. Divya Villagomez on 11/10/20. No abnormalities were visualized. CT urogram demonstrates small, bilateral, non-obstructive nephrolithiasis. Otherwise, no significant findings. With his cytology indicating the possibility of high-grade urothelial carcinoma and his family history of bladder cancer, cystoscopy with bilateral retrograde pyelograms, bilateral ureteroscopy, and bilateral ureteral stent placement is highly recommended. The technical aspects of the procedure were reviewed, along with the potential risks. He understands the importance of additional diagnostic studies but he is very concerned with tan Covid-19 at this time. He understands the risks of delaying his procedure but would like to discuss this further with his wife and consider all possibilities before selecting to proceed. Patient was instructed to call with any questions or concerns that may arise while he is considering his options and let us know how he would like to proceed.      3. Nephrolithiasis- Left renal and right renal, non-obstructing stones per CT dated 11/20. Asymptomatic. Patient has not obtained [employer] to date. Keith Egan is a 66 y.o. male evaluated via telephone on 11/23/2020.       Consent:  He and/or health care decision maker is aware that that he may receive a bill for this telephone service, depending on his insurance coverage, and has provided verbal consent to proceed:Yes      Documentation:  I communicated with the patient and/or health care decision maker about: SEE A/P  Details of this discussion including any medical advice provided: SEE A/P      I affirm this is a Patient Initiated Episode with a Patient who has not had a related appointment within my department in the past 7 days or scheduled within the next 24 hours.     Patient identification was verified at the start of the visit: YES    Total Time: 10 minutes    Note: not billable if this call serves to triage the patient into an appointment for the relevant concern      Eunice Stovall

## 2020-11-21 ENCOUNTER — HOSPITAL ENCOUNTER (OUTPATIENT)
Dept: CT IMAGING | Age: 75
Discharge: HOME | End: 2020-11-21
Payer: MEDICARE

## 2020-11-21 LAB — POC CREATININE WHOLE BLOOD: 0.9 MG/DL (ref 0.5–1.2)

## 2020-11-21 PROCEDURE — 6360000004 HC RX CONTRAST MEDICATION: Performed by: UROLOGY

## 2020-11-21 PROCEDURE — 82565 ASSAY OF CREATININE: CPT

## 2020-11-21 PROCEDURE — 74178 CT ABD&PLV WO CNTR FLWD CNTR: CPT

## 2020-11-21 RX ADMIN — IOPAMIDOL 85 ML: 755 INJECTION, SOLUTION INTRAVENOUS at 10:39

## 2020-11-23 ENCOUNTER — VIRTUAL VISIT (OUTPATIENT)
Dept: UROLOGY | Age: 75
End: 2020-11-23
Payer: MEDICARE

## 2020-11-23 PROBLEM — R82.89 ABNORMAL URINE CYTOLOGY: Status: ACTIVE | Noted: 2020-11-23

## 2020-11-23 PROCEDURE — 99441 PR PHYS/QHP TELEPHONE EVALUATION 5-10 MIN: CPT | Performed by: PHYSICIAN ASSISTANT

## 2021-01-15 ENCOUNTER — TELEPHONE (OUTPATIENT)
Dept: UROLOGY | Age: 76
End: 2021-01-15

## 2021-01-15 NOTE — TELEPHONE ENCOUNTER
Would you please call Mr. Deepika Valdivia to see if he has made any decision whether or not he would like to proceed with  cystoscopy with bilateral retrograde pyelograms, bilateral ureteroscopy, and bilateral ureteral stent placement for his history of abnormal urine cytology and extensive family history of bladder cancer. I last spoke to him via telephone on 11/23/20. He had stated he wanted to discuss this with his wife and would get back with us and let us know how he would like to proceed.

## 2021-01-19 NOTE — TELEPHONE ENCOUNTER
Spoke to patient wife on HIPPA. She will have him call back to the office with his decision. He has not made it yet. Thank you.

## 2021-03-19 ENCOUNTER — TELEPHONE (OUTPATIENT)
Dept: UROLOGY | Age: 76
End: 2021-03-19

## 2021-03-19 NOTE — TELEPHONE ENCOUNTER
Would you please call Mr. Win Durham to see if he has made any decision whether or not he would like to proceed with cystoscopy with bilateral retrograde pyelograms, bilateral ureteroscopy, and bilateral ureteral stent placement for his history of abnormal urine cytology and extensive family history of bladder cancer. I last spoke to him via telephone on 11/23/20. He had stated he wanted to discuss this with his wife and would get back with us and let us know how he would like to proceed.

## 2021-10-05 LAB — PROSTATE SPECIFIC ANTIGEN: NORMAL

## 2021-10-11 ENCOUNTER — OFFICE VISIT (OUTPATIENT)
Dept: UROLOGY | Age: 76
End: 2021-10-11
Payer: MEDICARE

## 2021-10-11 VITALS
HEART RATE: 84 BPM | SYSTOLIC BLOOD PRESSURE: 162 MMHG | HEIGHT: 72 IN | WEIGHT: 181 LBS | DIASTOLIC BLOOD PRESSURE: 90 MMHG | BODY MASS INDEX: 24.52 KG/M2

## 2021-10-11 DIAGNOSIS — R82.89 ABNORMAL URINE CYTOLOGY: ICD-10-CM

## 2021-10-11 DIAGNOSIS — C61 PROSTATE CANCER (HCC): Primary | ICD-10-CM

## 2021-10-11 LAB
BILIRUBIN URINE: NEGATIVE
BLOOD URINE, POC: ABNORMAL
CHARACTER, URINE: CLEAR
COLOR, URINE: YELLOW
GLUCOSE URINE: NEGATIVE MG/DL
KETONES, URINE: NEGATIVE
LEUKOCYTE CLUMPS, URINE: NEGATIVE
NITRITE, URINE: NEGATIVE
PH, URINE: 6.5 (ref 5–9)
PROTEIN, URINE: NEGATIVE MG/DL
SPECIFIC GRAVITY, URINE: 1.02 (ref 1–1.03)
UROBILINOGEN, URINE: 0.2 EU/DL (ref 0–1)

## 2021-10-11 PROCEDURE — 81003 URINALYSIS AUTO W/O SCOPE: CPT | Performed by: PHYSICIAN ASSISTANT

## 2021-10-11 PROCEDURE — 99213 OFFICE O/P EST LOW 20 MIN: CPT | Performed by: PHYSICIAN ASSISTANT

## 2021-10-11 RX ORDER — LORATADINE 10 MG/1
10 CAPSULE, LIQUID FILLED ORAL DAILY
COMMUNITY

## 2021-10-11 NOTE — PROGRESS NOTES
[address] [address] [place] [address]  Dept: [phone]  Loc: [phone]      Mr. Denisse Briscoe was seen in follow up for   Chief Complaint   Patient presents with    Follow-up     prostate ca psa prior         HPI:  Mr. Denisse Briscoe is a 17-year-old male status post RALRP with Right Pelvic Lymph Node Dissection on on 9/14/12. His pathology was significant for Joan's 3+3=6 Stage [id number] Prostate Cancer. He has rare episodes of scant stress incontinence but no voiding difficulties, gross hematuria, or dysuria. He performs daily pelvic floor therapy. His PSA levels have consistantly ranged from 0.01-0.05. He does have a history of atypical cells on urine cytology. This was first discovered in 2017. His Bladder Cx at this time revealed a mildly elevated level of 0.15. He underwent office cystoscopy with Dr. Honey Segura in October 2017. No abnormal findings were demonstrated. CT scan (9/17) revealed bilateral small stones. His Bladder Cx in 2019 was within normal limits. Unfortunately, his urine cytology in October 2020 demonstrated atypical urothelial cells, suspicious for high grade urothelial carcinoma. He underwent a cystoscopy with Dr. Giovanni Betancourt in November 2020. His cystoscopic evaluation was normal.  CT urogram at this time demonstrated bilateral stones but no other  abnormalities. Today, he denies bone pain, weight loss or constitutional symptoms. He denies any general medical concerns such as chest pain, shortness of breath, N/V, calf pain, HA, lightheadedness or fever/chills. He presents today for further evaluation.     Past Medical History:   Diagnosis Date    Arthritis     ankles and knees    Cancer [facility])     prostate    Enlarged prostate     Epididymal cyst 2013    Hyperlipidemia     Prostate nodule        Past Surgical History:   Procedure Laterality Date    ABDOMEN SURGERY      CATARACT REMOVAL      COLONOSCOPY      EYE SURGERY      detatched retina repaired right side;bilateral cateracts    KNEE SURGERY Right 2017    Torn Meniscus     PROSTATE SURGERY  2012 Dr Ricketts Sides       Current Outpatient Medications on File Prior to Visit   Medication Sig Dispense Refill    loratadine (CLARITIN) 10 MG capsule Take 10 mg by mouth daily      lisinopril (PRINIVIL;ZESTRIL) 10 MG tablet       latanoprost (XALATAN) 0.005 % ophthalmic solution 1 drop nightly      Lutein 6 MG CAPS Take by mouth      simvastatin (ZOCOR) 20 MG tablet Take 20 mg by mouth nightly. No current facility-administered medications on file prior to visit. No Known Allergies    Family History   Problem Relation Age of Onset    Heart Disease Mother     Heart Disease Father     Stroke Father     Prostate Cancer Maternal Grandfather 80       Social History     Socioeconomic History    Marital status:      Spouse name: Not on file    Number of children: Not on file    Years of education: Not on file    Highest education level: Not on file   Occupational History    Not on file   Tobacco Use    Smoking status: Former Smoker     Packs/day: 1.00     Years: 4.00     Pack years: 4.00     Types: Cigarettes     Quit date: 1968     Years since quittin.1    Smokeless tobacco: Never Used    Tobacco comment: quit in    Substance and Sexual Activity    Alcohol use: No    Drug use: No    Sexual activity: Not on file   Other Topics Concern    Not on file   Social History Narrative    Not on file     Social Determinants of Health     Financial Resource Strain:     Difficulty of Paying Living Expenses:    Food Insecurity:     Worried About Running Out of Food in the Last Year:     [address] in the Last Year:    Transportation Needs:     Lack of Transportation (Medical):      Lack of Transportation (Non-Medical):    Physical Activity:     Days of Exercise per Week:     Minutes of Exercise per Session:    Stress:     Feeling of Stress :    Social Connections:     Frequency of Communication with Friends and Family:     Frequency of Social Gatherings with Friends and Family:     Attends Baptism Services:     Active Member of Clubs or Organizations:     Attends Club or Organization Meetings:     Marital Status:    Intimate Partner Violence:     Fear of Current or Ex-Partner:     Emotionally Abused:     Physically Abused:     Sexually Abused:        Review of Systems  Constitutional: Negative for fatigue, fever and unexpected weight change. HENT: Negative for congestion and trouble swallowing. Eyes: Negative for pain and itching. Respiratory: Negative for cough and shortness of breath. Cardiovascular: Negative for chest pain and leg swelling. Gastrointestinal: Negative for abdominal pain, constipation, diarrhea and nausea. Endocrine: Negative for cold intolerance and heat intolerance. Genitourinary: See HPI. Musculoskeletal: Negative for back pain and joint swelling. Skin: Negative for rash. Neurological: Negative for dizziness, weakness, numbness and headaches. Psychiatric/Behavioral: The patient is not nervous/anxious. Exam    BP (!) 162/90   Pulse 84   Ht 6' (1.829 m)   Wt 181 lb (82.1 kg)   BMI 24.55 kg/m²      Constitutional: Oriented to person, place, and time. Vital signs are normal. Appears well-developed and well-nourished. Cooperative. No distress. HENT:    Head: Normocephalic and atraumatic.    Eyes: EOM are normal. Pupils are equal, round, and reactive to light. Right eye exhibits no discharge. Left eye exhibits no discharge. No scleral icterus. Neck: Trachea normal.    Cardiovascular: Normal rate and regular rhythm. S1 and S2 normal.  Pulmonary/Chest: Effort normal. Clear bilaterally without rales, rhonchi, wheezes.  No respiratory

## 2023-04-08 ENCOUNTER — HOSPITAL ENCOUNTER (EMERGENCY)
Age: 78
Discharge: HOME | End: 2023-04-08

## 2023-04-08 VITALS
SYSTOLIC BLOOD PRESSURE: 185 MMHG | BODY MASS INDEX: 25.09 KG/M2 | WEIGHT: 185 LBS | DIASTOLIC BLOOD PRESSURE: 89 MMHG | OXYGEN SATURATION: 98 % | HEART RATE: 71 BPM | TEMPERATURE: 97.6 F | RESPIRATION RATE: 15 BRPM

## 2023-04-08 DIAGNOSIS — H00.033 ABSCESS, EYELID, RIGHT: Primary | ICD-10-CM

## 2023-04-08 RX ORDER — TOBRAMYCIN AND DEXAMETHASONE 3; 1 MG/ML; MG/ML
1 SUSPENSION/ DROPS OPHTHALMIC
Qty: 2.5 ML | Refills: 0 | Status: SHIPPED | OUTPATIENT
Start: 2023-04-08 | End: 2023-04-18

## 2023-04-08 ASSESSMENT — ENCOUNTER SYMPTOMS
VOMITING: 0
COUGH: 0
SORE THROAT: 0
EYE PAIN: 0
EYE DISCHARGE: 0
PHOTOPHOBIA: 0
NAUSEA: 0
RHINORRHEA: 0
EYE REDNESS: 1
EYE ITCHING: 0
SHORTNESS OF BREATH: 0
TROUBLE SWALLOWING: 0
DIARRHEA: 0

## 2023-04-08 ASSESSMENT — PAIN - FUNCTIONAL ASSESSMENT: PAIN_FUNCTIONAL_ASSESSMENT: NONE - DENIES PAIN

## 2023-04-08 NOTE — DISCHARGE INSTRUCTIONS
Use eye drops/antibiotics as prescribed in affected eye. You may use a warm compress or washcloth for discomfort and Tylenol or Motrin per package instructions for pain. Monitor for increased pain, fever, redness, or drainage. Follow up with an eye-care specialist immediately if you notice significant pain, swelling around the eye, flashers or floaters, or visual changes.

## 2023-04-08 NOTE — ED PROVIDER NOTES
Cervical: No cervical adenopathy. Skin:     General: Skin is warm and dry. Capillary Refill: Capillary refill takes less than 2 seconds. Neurological:      General: No focal deficit present. Mental Status: He is alert and oriented to person, place, and time. Psychiatric:         Mood and Affect: Mood normal.         Behavior: Behavior normal.         Thought Content: Thought content normal.         Judgment: Judgment normal.       DIAGNOSTIC RESULTS   Labs:No results found for this visit on 04/08/23. IMAGING:  No orders to display      URGENT CARE COURSE:     Vitals:    04/08/23 1734   BP: (!) 185/89   Pulse: 71   Resp: 15   Temp: 97.6 °F (36.4 °C)   TempSrc: Tympanic   SpO2: 98%   Weight: 185 lb (83.9 kg)       Medications - No data to display  PROCEDURES:  None  FINAL IMPRESSION      1. Abscess, eyelid, right        DISPOSITION/PLAN   DISPOSITION Decision To Discharge 04/08/2023 05:44:38 PM    Patient will be placed on eyedrops and is advised to follow-up with PCP in 1 week if no improvement in symptoms. He was also advised that if swelling spreads around the eyelid and he winds up with cellulitis that he needs to return for oral antibiotics. Discharged home in good condition.   PATIENT REFERRED TO:  Chasity Causey [id number]  [phone]    In 1 week  If symptoms worsen    DISCHARGE MEDICATIONS:  Discharge Medication List as of 4/8/2023  5:46 PM        START taking these medications    Details   tobramycin-dexamethasone (TOBRADEX) 0.3-0.1 % ophthalmic suspension Place 1 drop into the right eye every 4 hours (while awake) for 10 days, Disp-2.5 mL, R-0Normal           Discharge Medication List as of 4/8/2023  5:46 PM          Jamin Wagner, JOSE A - CNP  04/08/23 3346

## 2025-06-02 ENCOUNTER — LAB (OUTPATIENT)
Dept: LAB | Age: 80
End: 2025-06-02

## 2025-06-02 LAB
ALBUMIN SERPL BCG-MCNC: 4.3 G/DL (ref 3.4–4.9)
ALP SERPL-CCNC: 72 U/L (ref 40–129)
ALT SERPL W/O P-5'-P-CCNC: 20 U/L (ref 10–50)
ANION GAP SERPL CALC-SCNC: 11 MEQ/L (ref 8–16)
AST SERPL-CCNC: 24 U/L (ref 10–50)
BASOPHILS ABSOLUTE: 0.1 THOU/MM3 (ref 0–0.1)
BASOPHILS NFR BLD AUTO: 0.9 %
BILIRUB SERPL-MCNC: 0.6 MG/DL (ref 0.3–1.2)
BUN SERPL-MCNC: 21 MG/DL (ref 8–23)
CALCIUM SERPL-MCNC: 9.7 MG/DL (ref 8.8–10.2)
CHLORIDE SERPL-SCNC: 104 MEQ/L (ref 98–111)
CHOLEST SERPL-MCNC: 140 MG/DL (ref 100–199)
CO2 SERPL-SCNC: 26 MEQ/L (ref 22–29)
CREAT SERPL-MCNC: 1 MG/DL (ref 0.7–1.2)
DEPRECATED RDW RBC AUTO: 46.6 FL (ref 35–45)
EOSINOPHIL NFR BLD AUTO: 2.8 %
EOSINOPHILS ABSOLUTE: 0.2 THOU/MM3 (ref 0–0.4)
ERYTHROCYTE [DISTWIDTH] IN BLOOD BY AUTOMATED COUNT: 13.1 % (ref 11.5–14.5)
GFR SERPL CREATININE-BSD FRML MDRD: 75 ML/MIN/1.73M2
GLUCOSE SERPL-MCNC: 91 MG/DL (ref 74–109)
HCT VFR BLD AUTO: 44.7 % (ref 42–52)
HDLC SERPL-MCNC: 48 MG/DL
HGB BLD-MCNC: 14.9 GM/DL (ref 14–18)
IMM GRANULOCYTES # BLD AUTO: 0.02 THOU/MM3 (ref 0–0.07)
IMM GRANULOCYTES NFR BLD AUTO: 0.3 %
LDLC SERPL CALC-MCNC: 78 MG/DL
LYMPHOCYTES ABSOLUTE: 1.9 THOU/MM3 (ref 1–4.8)
LYMPHOCYTES NFR BLD AUTO: 28.2 %
MCH RBC QN AUTO: 32.3 PG (ref 26–33)
MCHC RBC AUTO-ENTMCNC: 33.3 GM/DL (ref 32.2–35.5)
MCV RBC AUTO: 96.8 FL (ref 80–94)
MONOCYTES ABSOLUTE: 0.8 THOU/MM3 (ref 0.4–1.3)
MONOCYTES NFR BLD AUTO: 12.1 %
NEUTROPHILS ABSOLUTE: 3.8 THOU/MM3 (ref 1.8–7.7)
NEUTROPHILS NFR BLD AUTO: 55.7 %
NRBC BLD AUTO-RTO: 0 /100 WBC
PLATELET # BLD AUTO: 187 THOU/MM3 (ref 130–400)
PMV BLD AUTO: 9.6 FL (ref 9.4–12.4)
POTASSIUM SERPL-SCNC: 4.9 MEQ/L (ref 3.5–5.2)
PROT SERPL-MCNC: 7.1 G/DL (ref 6.4–8.3)
RBC # BLD AUTO: 4.62 MILL/MM3 (ref 4.7–6.1)
SODIUM SERPL-SCNC: 141 MEQ/L (ref 135–145)
TRIGL SERPL-MCNC: 68 MG/DL (ref 0–199)
WBC # BLD AUTO: 6.8 THOU/MM3 (ref 4.8–10.8)

## 2025-07-08 ENCOUNTER — APPOINTMENT (OUTPATIENT)
Dept: GENERAL RADIOLOGY | Age: 80
DRG: 522 | End: 2025-07-08
Payer: MEDICARE

## 2025-07-08 ENCOUNTER — HOSPITAL ENCOUNTER (INPATIENT)
Age: 80
LOS: 3 days | Discharge: HOME | DRG: 522 | End: 2025-07-11
Attending: EMERGENCY MEDICINE | Admitting: ORTHOPAEDIC SURGERY
Payer: MEDICARE

## 2025-07-08 ENCOUNTER — APPOINTMENT (OUTPATIENT)
Dept: CT IMAGING | Age: 80
DRG: 522 | End: 2025-07-08
Payer: MEDICARE

## 2025-07-08 DIAGNOSIS — S72.002A CLOSED FRACTURE OF LEFT HIP, INITIAL ENCOUNTER (HCC): Primary | ICD-10-CM
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
## 2025-07-08 LAB
ABO GROUP BLD: NORMAL
ALBUMIN SERPL BCG-MCNC: 4.4 G/DL (ref 3.4–4.9)
ALP SERPL-CCNC: 65 U/L (ref 40–129)
ALT SERPL W/O P-5'-P-CCNC: 23 U/L (ref 10–50)
ANION GAP SERPL CALC-SCNC: 13 MEQ/L (ref 8–16)
AST SERPL-CCNC: 23 U/L (ref 10–50)
BASOPHILS ABSOLUTE: 0 THOU/MM3 (ref 0–0.1)
BASOPHILS NFR BLD AUTO: 0.5 %
BILIRUB SERPL-MCNC: 0.4 MG/DL (ref 0.3–1.2)
BUN SERPL-MCNC: 27 MG/DL (ref 8–23)
CALCIUM SERPL-MCNC: 9.9 MG/DL (ref 8.8–10.2)
CHLORIDE SERPL-SCNC: 103 MEQ/L (ref 98–111)
CO2 SERPL-SCNC: 24 MEQ/L (ref 22–29)
CREAT SERPL-MCNC: 1 MG/DL (ref 0.7–1.2)
DEPRECATED RDW RBC AUTO: 46.6 FL (ref 35–45)
EKG ATRIAL RATE: 69 BPM
EKG P AXIS: 76 DEGREES
EKG P-R INTERVAL: 188 MS
EKG Q-T INTERVAL: 410 MS
EKG QRS DURATION: 98 MS
EKG QTC CALCULATION (BAZETT): 439 MS
EKG R AXIS: 2 DEGREES
EKG T AXIS: 78 DEGREES
EKG VENTRICULAR RATE: 69 BPM
EOSINOPHIL NFR BLD AUTO: 1.1 %
EOSINOPHILS ABSOLUTE: 0.1 THOU/MM3 (ref 0–0.4)
ERYTHROCYTE [DISTWIDTH] IN BLOOD BY AUTOMATED COUNT: 13 % (ref 11.5–14.5)
GFR SERPL CREATININE-BSD FRML MDRD: 75 ML/MIN/1.73M2
GLUCOSE SERPL-MCNC: 118 MG/DL (ref 74–109)
HCT VFR BLD AUTO: 42.7 % (ref 42–52)
HGB BLD-MCNC: 14.3 GM/DL (ref 14–18)
IAT IGG-SP REAG SERPL QL: NORMAL
IMM GRANULOCYTES # BLD AUTO: 0.04 THOU/MM3 (ref 0–0.07)
IMM GRANULOCYTES NFR BLD AUTO: 0.5 %
LIPASE SERPL-CCNC: 42 U/L (ref 13–60)
LYMPHOCYTES ABSOLUTE: 1.2 THOU/MM3 (ref 1–4.8)
LYMPHOCYTES NFR BLD AUTO: 15.1 %
MAGNESIUM SERPL-MCNC: 2.1 MG/DL (ref 1.6–2.6)
MCH RBC QN AUTO: 32.4 PG (ref 26–33)
MCHC RBC AUTO-ENTMCNC: 33.5 GM/DL (ref 32.2–35.5)
MCV RBC AUTO: 96.6 FL (ref 80–94)
MONOCYTES ABSOLUTE: 0.8 THOU/MM3 (ref 0.4–1.3)
MONOCYTES NFR BLD AUTO: 10.1 %
NEUTROPHILS ABSOLUTE: 5.9 THOU/MM3 (ref 1.8–7.7)
NEUTROPHILS NFR BLD AUTO: 72.7 %
NRBC BLD AUTO-RTO: 0 /100 WBC
OSMOLALITY SERPL CALC.SUM OF ELEC: 285.6 MOSMOL/KG (ref 275–300)
PLATELET # BLD AUTO: 158 THOU/MM3 (ref 130–400)
PMV BLD AUTO: 9.6 FL (ref 9.4–12.4)
POTASSIUM SERPL-SCNC: 4.3 MEQ/L (ref 3.5–5.2)
PROT SERPL-MCNC: 6.8 G/DL (ref 6.4–8.3)
RBC # BLD AUTO: 4.42 MILL/MM3 (ref 4.7–6.1)
RH BLD: NORMAL
SODIUM SERPL-SCNC: 140 MEQ/L (ref 135–145)
WBC # BLD AUTO: 8.1 THOU/MM3 (ref 4.8–10.8)

## 2025-07-08 PROCEDURE — 72125 CT NECK SPINE W/O DYE: CPT

## 2025-07-08 PROCEDURE — 72128 CT CHEST SPINE W/O DYE: CPT

## 2025-07-08 PROCEDURE — 6370000000 HC RX 637 (ALT 250 FOR IP): Performed by: EMERGENCY MEDICINE

## 2025-07-08 PROCEDURE — 76376 3D RENDER W/INTRP POSTPROCES: CPT

## 2025-07-08 PROCEDURE — 80053 COMPREHEN METABOLIC PANEL: CPT

## 2025-07-08 PROCEDURE — 72170 X-RAY EXAM OF PELVIS: CPT

## 2025-07-08 PROCEDURE — 83735 ASSAY OF MAGNESIUM: CPT

## 2025-07-08 PROCEDURE — 1200000000 HC SEMI PRIVATE

## 2025-07-08 PROCEDURE — 2580000003 HC RX 258: Performed by: PHYSICIAN ASSISTANT

## 2025-07-08 PROCEDURE — 93005 ELECTROCARDIOGRAM TRACING: CPT

## 2025-07-08 PROCEDURE — 99285 EMERGENCY DEPT VISIT HI MDM: CPT

## 2025-07-08 PROCEDURE — 6370000000 HC RX 637 (ALT 250 FOR IP): Performed by: PHYSICIAN ASSISTANT

## 2025-07-08 PROCEDURE — 6820000001 HC L2 TRAUMA SURGERY EVALUATION: Performed by: ORTHOPAEDIC SURGERY

## 2025-07-08 PROCEDURE — 83690 ASSAY OF LIPASE: CPT

## 2025-07-08 PROCEDURE — 74177 CT ABD & PELVIS W/CONTRAST: CPT

## 2025-07-08 PROCEDURE — 86885 COOMBS TEST INDIRECT QUAL: CPT

## 2025-07-08 PROCEDURE — 73552 X-RAY EXAM OF FEMUR 2/>: CPT

## 2025-07-08 PROCEDURE — 99223 1ST HOSP IP/OBS HIGH 75: CPT

## 2025-07-08 PROCEDURE — 6360000004 HC RX CONTRAST MEDICATION

## 2025-07-08 PROCEDURE — 86900 BLOOD TYPING SEROLOGIC ABO: CPT

## 2025-07-08 PROCEDURE — 71045 X-RAY EXAM CHEST 1 VIEW: CPT

## 2025-07-08 PROCEDURE — 36415 COLL VENOUS BLD VENIPUNCTURE: CPT

## 2025-07-08 PROCEDURE — 86901 BLOOD TYPING SEROLOGIC RH(D): CPT

## 2025-07-08 PROCEDURE — 85025 COMPLETE CBC W/AUTO DIFF WBC: CPT

## 2025-07-08 RX ORDER — IOPAMIDOL 755 MG/ML
80 INJECTION, SOLUTION INTRAVASCULAR
Status: COMPLETED | OUTPATIENT
Start: 2025-07-08 | End: 2025-07-08

## 2025-07-08 RX ORDER — SODIUM CHLORIDE 9 MG/ML
INJECTION, SOLUTION INTRAVENOUS CONTINUOUS
Status: DISCONTINUED | OUTPATIENT
Start: 2025-07-08 | End: 2025-07-11 | Stop reason: HOSPADM

## 2025-07-08 RX ORDER — ATORVASTATIN CALCIUM 10 MG/1
10 TABLET, FILM COATED ORAL DAILY
Status: DISCONTINUED | OUTPATIENT
Start: 2025-07-09 | End: 2025-07-11 | Stop reason: HOSPADM

## 2025-07-08 RX ORDER — MORPHINE SULFATE 2 MG/ML
2 INJECTION, SOLUTION INTRAMUSCULAR; INTRAVENOUS
Refills: 0 | Status: DISCONTINUED | OUTPATIENT
Start: 2025-07-08 | End: 2025-07-10

## 2025-07-08 RX ORDER — LISINOPRIL 10 MG/1
10 TABLET ORAL DAILY
Status: DISCONTINUED | OUTPATIENT
Start: 2025-07-09 | End: 2025-07-11 | Stop reason: HOSPADM

## 2025-07-08 RX ORDER — HYDROCODONE/ACETAMINOPHEN 5 MG-500MG
6 TABLET ORAL DAILY
Status: DISCONTINUED | OUTPATIENT
Start: 2025-07-08 | End: 2025-07-08 | Stop reason: RX

## 2025-07-08 RX ORDER — ACETAMINOPHEN 325 MG/1
650 TABLET ORAL ONCE
Status: COMPLETED | OUTPATIENT
Start: 2025-07-08 | End: 2025-07-08

## 2025-07-08 RX ORDER — CETIRIZINE HYDROCHLORIDE 5 MG/1
5 TABLET ORAL DAILY
Status: DISCONTINUED | OUTPATIENT
Start: 2025-07-09 | End: 2025-07-11 | Stop reason: HOSPADM

## 2025-07-08 RX ORDER — HYDROCODONE BITARTRATE AND ACETAMINOPHEN 5; 325 MG/1; MG/1
2 TABLET ORAL EVERY 4 HOURS PRN
Status: DISCONTINUED | OUTPATIENT
Start: 2025-07-08 | End: 2025-07-10

## 2025-07-08 RX ORDER — LATANOPROST 50 UG/ML
1 SOLUTION/ DROPS OPHTHALMIC NIGHTLY
Status: DISCONTINUED | OUTPATIENT
Start: 2025-07-08 | End: 2025-07-11 | Stop reason: HOSPADM

## 2025-07-08 RX ADMIN — SODIUM CHLORIDE: 0.9 INJECTION, SOLUTION INTRAVENOUS at 22:46

## 2025-07-08 RX ADMIN — IOPAMIDOL 80 ML: 755 INJECTION, SOLUTION INTRAVENOUS at 19:08

## 2025-07-08 RX ADMIN — HYDROCODONE BITARTRATE AND ACETAMINOPHEN 2 TABLET: 5; 325 TABLET ORAL at 22:01

## 2025-07-08 RX ADMIN — ACETAMINOPHEN 650 MG: 325 TABLET ORAL at 17:58

## 2025-07-08 ASSESSMENT — PAIN - FUNCTIONAL ASSESSMENT
PAIN_FUNCTIONAL_ASSESSMENT: 0-10
PAIN_FUNCTIONAL_ASSESSMENT: WONG-BAKER FACES
PAIN_FUNCTIONAL_ASSESSMENT: 0-10
PAIN_FUNCTIONAL_ASSESSMENT: 0-10

## 2025-07-08 ASSESSMENT — PAIN SCALES - GENERAL
PAINLEVEL_OUTOF10: 5
PAINLEVEL_OUTOF10: 4
PAINLEVEL_OUTOF10: 10
PAINLEVEL_OUTOF10: 6
PAINLEVEL_OUTOF10: 7

## 2025-07-08 ASSESSMENT — PAIN DESCRIPTION - ORIENTATION
ORIENTATION: LEFT
ORIENTATION: LEFT

## 2025-07-08 ASSESSMENT — PAIN DESCRIPTION - LOCATION
LOCATION: HIP
LOCATION: HIP

## 2025-07-08 ASSESSMENT — PAIN DESCRIPTION - DESCRIPTORS
DESCRIPTORS: ACHING
DESCRIPTORS: ACHING

## 2025-07-08 ASSESSMENT — PAIN SCALES - WONG BAKER: WONGBAKER_NUMERICALRESPONSE: HURTS LITTLE MORE

## 2025-07-08 NOTE — ED NOTES
Pt is resting in bed with eyes open. Respirations are even and unlabored. Pt denies needs at this time. Telemetry in place. Call light within reach.

## 2025-07-08 NOTE — ED PROVIDER NOTES
[facility] EMERGENCY DEPARTMENT  EMERGENCY DEPARTMENT ENCOUNTER          Pt Name: Ric Collins  MRN: [id number]  Birthdate 1945  Date of evaluation: 7/8/2025  Physician: Shahzad Mendez MD  Supervising Attending Physician: Dariel Marion MD       CHIEF COMPLAINT       Chief Complaint   Patient presents with    Fall         HISTORY OF PRESENT ILLNESS    HPI  Ric Collins is a 80 y.o. male who presents to the emergency department from home, brought in by EMS for evaluation of mechanical fall.  The patient had a mechanical approximately 1 hour prior to arrival.  He reports this.  Mechanical in nature.  He was walking, turned too fast, and slipped over his shoe.  This resulted in a fall to his left side.  The patient denies any loss of consciousness, did not hit his head.  Does not take anticoagulation.  He was able to get off the floor due to the pain he is having in his left femur region.  His family did help him stand up, and he was able to bear weight, but he states the left pain was too severe, so he called EMS to bring him in.  The patient did not take anything prior to arrival for pain, but there is not requiring anything currently.  The family denies him being confused since the fall.  The patient does not have any prosthetic hips.  The patient is only complaining of pain in his left hip and left femur region, no other point tenderness on physical exam.  No evidence of skin abrasions anywhere.  The patient has no prodrome of chest pain/pressure/tightness or shortness of breath.  He currently does not have any of those symptoms either.  The patient has no other acute complaints at this time.      PAST MEDICAL AND SURGICAL HISTORY     Past Medical History:   Diagnosis Date    Arthritis     ankles and knees    Cancer (HCC)     prostate    Enlarged prostate     Epididymal cyst 2013    Hyperlipidemia     Prostate nodule      Past Surgical History:   Procedure Laterality Date    ABDOMEN

## 2025-07-08 NOTE — ED PROVIDER NOTES
I performed a history and physical examination of the patient and discussed management with the resident. I reviewed the resident’s note and agree with the documented findings and plan of care. Any areas of disagreement are noted on the chart. I was personally present for the key portions of any procedures. I have documented in the chart those procedures where I was not present during the key portions. I have reviewed the emergency nurses triage note. I agree with the chief complaint, past medical history, past surgical history, allergies, medications, social and family history as documented unless otherwise noted below. Documentation of the HPI, Physical Exam and Medical Decision Making performed by medical students or scribes is based on my personal performance of the HPI, PE and MDM. For Phys Assistant/ Nurse Practitioner cases/documentation I have personally evaluated this patient and have completed at least one if not all key elements of the E/M (history, physical exam, and MDM). My findings are as noted below.    In other words, I personally saw and examined the patient I have reviewed and agreed with the resident findings including all diagnostic interpretations and treatment plans as written.  I was present for the key portion of any procedures performed and the inclusive time noted in any critical care statement.    Patient presents today for left hip pain.  Apparently the patient was in the garage he was rummaging around his tripped and fell backwards.  He fell onto his left hip.  He was unable to stand by himself.  They brought him in via squad.  Patient denies hitting his head no loss of consciousness he is not on any blood thinners.  He has hypertension hyperlipidemia.  Patient is otherwise resting comfortably on the cot no apparent distress no other physical complaints at this time.  Last time the patient ate was 2 PM today.    EKG shows normal sinus rhythm normal axis ventricular rate of 69 MN  MD on    07/08/2025 08:17 PM      All CTs at this facility use dose modulation techniques and iterative    reconstructions, and/or weight-based dosing   when appropriate to reduce radiation to a low as reasonably achievable.      XR PELVIS (1-2 VIEWS)   Final Result   Left femoral neck fracture.      This document has been electronically signed by: Glen Macdonald MD on    07/08/2025 06:22 PM      XR FEMUR LEFT (MIN 2 VIEWS)   Final Result   Left femoral neck fracture.      This document has been electronically signed by: Glen Macdonald MD on    07/08/2025 06:23 PM      XR CHEST PORTABLE   Final Result   Mild left lower lobe atelectasis.      This document has been electronically signed by: Glen Macdonald MD on    07/08/2025 06:23 PM        Labs Reviewed   CBC WITH AUTO DIFFERENTIAL - Abnormal; Notable for the following components:       Result Value    RBC 4.42 (*)     MCV 96.6 (*)     RDW-SD 46.6 (*)     All other components within normal limits   COMPREHENSIVE METABOLIC PANEL - Abnormal; Notable for the following components:    Glucose 118 (*)     BUN 27 (*)     All other components within normal limits   LIPASE   MAGNESIUM   ANION GAP   OSMOLALITY   GLOMERULAR FILTRATION RATE, ESTIMATED   TYPE AND SCREEN       Not applicable    Final diagnoses:   Closed fracture of left hip, initial encounter (HCC)   Fall, initial encounter   .  I have seen this patient with the resident Dr. Yoon and agree with his assessment and plan     Beni Sherman, DO  07/08/25 2034

## 2025-07-08 NOTE — ED NOTES
Pt is resting in bed with eyes open. Respirations are even and unlabored. Medicated per MAR. Telemetry in place. Call light within reach.

## 2025-07-08 NOTE — CONSULTS
Hospitalist Consult Note      Patient:  Ric Collins    Unit/Bed:23/023A  YOB: 1945  MRN: [id number]   Acct: [id number]   PCP: Dariel Piper DO  Code Status: Prior  Date of Admission: 7/8/2025  Date of Service: Pt seen/examined in consultation on 7/8/2025      Chief Complaint:  Fall  Reason for consult: Medical comorbidities    Assessment and Plan:    Acute Left Femoral Neck Fracture: XR Pelvis/Left Femur: Left femoral neck fracture. CT Left Hip: Left femoral neck fracture.   Orthopedics is primary and managing    Preoperative Risk assessment According to the RCRI score is 0 due to having a negative for high risk surgery, history of ischemic heart disease, history of heart failure, history of CVA, pre-operative treatment with insulin, and pre-operative creatinine greater than 2. >4 MET's w/o symptoms (4 METs: Can walk up a flight of steps or a hill or walk on level ground at 3 to 4 mph) Yes  Ric Collins is at a 3.9% risk for cardiac complications during intermediate risk procedure and wishes to continue as planned without further cardiac testing.     Mechanical Fall: Reports ambulating and turned too fast and tripped over his shoe, falling onto his left side. CT Cspine: no acute findings.   Fall precautions  PT/OT to eval and treat when Ortho clears    HTN:   Continue lisinopril with holding parameters    HLD:   Continue atorvastatin        History Of Present Illness:    Ric Collins 80 y.o. male who we are asked to see/evaluate by Dariel Marion MD for medical management.     Per ED HPI: \"Ric Collins is a 80 y.o. male who presents to the emergency department from home, brought in by EMS for evaluation of mechanical fall.  The patient had a mechanical approximately 1 hour prior to arrival.  He reports this.  Mechanical in nature.  He was walking, turned too fast, and slipped over his shoe.  This resulted in a fall to his left side.  The patient denies any loss of consciousness, did not  07/08/25  1745      K 4.3      CO2 24   BUN 27*   CREATININE 1.0   CALCIUM 9.9     Recent Labs     07/08/25  1745   AST 23   ALT 23   BILITOT 0.4   ALKPHOS 65     No results for input(s): \"INR\" in the last 72 hours.  No results for input(s): \"CKTOTAL\", \"TROPONINI\" in the last 72 hours.    Urinalysis:    Lab Results   Component Value Date/Time    NITRU Negative 10/11/2021 08:36 AM    WBCUA 0-2 10/03/2019 09:16 AM    BACTERIA NONE 10/03/2019 09:16 AM    RBCUA 5-10 10/03/2019 09:16 AM    BLOODU Moderate 10/11/2021 08:36 AM    BLOODU MODERATE 10/03/2019 09:16 AM    GLUCOSEU Negative 10/11/2021 08:36 AM       Radiology:   XR PELVIS (1-2 VIEWS)   Final Result   Left femoral neck fracture.      This document has been electronically signed by: Glen Macdonald MD on    07/08/2025 06:22 PM      XR FEMUR LEFT (MIN 2 VIEWS)   Final Result   Left femoral neck fracture.      This document has been electronically signed by: Glen Macdonald MD on    07/08/2025 06:23 PM      XR CHEST PORTABLE   Final Result   Mild left lower lobe atelectasis.      This document has been electronically signed by: lGen Macdonald MD on    07/08/2025 06:23 PM      CT ABDOMEN PELVIS W IV CONTRAST Additional Contrast? None    (Results Pending)   CT CERVICAL SPINE WO CONTRAST    (Results Pending)   CT THORACIC SPINE WO CONTRAST    (Results Pending)   CT LUMBAR RECONSTRUCTION WO POST PROCESS    (Results Pending)   CT RECONSTRUCTION WO POST PROCESS    (Results Pending)     XR FEMUR LEFT (MIN 2 VIEWS)  Result Date: 7/8/2025  2 view left femur Comparison: None provided Findings: Transversely oriented impacted subcapital left femoral neck fracture with minimal lateral angulation. Regional soft tissue swelling. Chronic left inferior pubic ramus fracture. Osteopenia. Trace knee joint effusion. Mild osteoarthritis of the knee. No radiopaque foreign body. Vascular calcifications.     Left femoral neck fracture. This document has been electronically signed

## 2025-07-08 NOTE — ED NOTES
Pt is resting in bed with eyes open. Respirations are even and unlabored. Pt updated on POC at this time. Telemetry in place. Call light within reach.

## 2025-07-08 NOTE — ED NOTES
ED to inpatient nurses report      Chief Complaint:  Chief Complaint   Patient presents with    Fall     Present to ED from: home    MOA:     LOC: alert and orientated to name, place, date  Mobility: Requires assistance * 2  Oxygen Baseline: RA    Current needs required: None      Code Status:   Prior    What abnormal results were found and what did you give/do to treat them? See chart, see results   Any procedures or intervention occur? See MAR    Mental Status:  Level of Consciousness: Alert (0)    Psych Assessment:        Vitals:  Patient Vitals for the past 24 hrs:   BP Temp Temp src Pulse Resp SpO2 Height Weight   07/08/25 1854 (!) 168/82 -- -- 62 16 96 % -- --   07/08/25 1800 (!) 166/83 -- -- 69 18 97 % -- --   07/08/25 1722 (!) 166/81 -- -- 66 14 97 % -- --   07/08/25 1635 (!) 175/95 98.6 °F (37 °C) Oral 70 14 96 % 1.829 m (6') 84.4 kg (186 lb)        LDAs:   Peripheral IV 07/08/25 Left Antecubital (Active)   Site Assessment Clean, dry & intact 07/08/25 1643   Line Status Normal saline locked 07/08/25 1643   Phlebitis Assessment No symptoms 07/08/25 1643   Infiltration Assessment 0 07/08/25 1643   Dressing Status Clean, dry & intact 07/08/25 1643   Dressing Type Transparent 07/08/25 1643       Ambulatory Status:  Presents to emergency department  because of falls (Syncope, seizure, or loss of consciousness): Yes, Age > 70: Yes, Altered Mental Status, Intoxication with alcohol or substance confusion (Disorientation, impaired judgment, poor safety awaremess, or inability to follow instructions): No, Impaired Mobility: Ambulates or transfers with assistive devices or assistance; Unable to ambulate or transer.: Yes, Nursing Judgement: Yes    Diagnosis:  DISPOSITION Admitted 07/08/2025 07:28:01 PM   Final diagnoses:   Closed fracture of left hip, initial encounter (HCC)   Fall, initial encounter        Consults:  IP CONSULT TO HOSPITALIST     Pain Score:  Pain Assessment  Pain Assessment: Rangel-Lopez FACES  Pain  7/8/2025 at 7:41 PM

## 2025-07-08 NOTE — ED TRIAGE NOTES
Pt presents to the ED via EMS after a fall. Pt says that he was reaching to grab something today when he tripped over his feet and fell onto his left hip. Pt complains of pain to his left hip with standing. No obvious deformity or shortening to the extremity. DP pulses present. Pt reports to some numbness right below the hip but denies any numbness or tingling in the foot. Pt able to move left foot. Pt denies hitting his head or LOC. No blood thinners per pt. Pt denies feeling light headed or dizzy prior to falling.

## 2025-07-09 ENCOUNTER — APPOINTMENT (OUTPATIENT)
Dept: GENERAL RADIOLOGY | Age: 80
DRG: 522 | End: 2025-07-09
Payer: MEDICARE

## 2025-07-09 ENCOUNTER — ANESTHESIA (OUTPATIENT)
Dept: OPERATING ROOM | Age: 80
End: 2025-07-09
Payer: MEDICARE

## 2025-07-09 ENCOUNTER — ANESTHESIA EVENT (OUTPATIENT)
Dept: OPERATING ROOM | Age: 80
End: 2025-07-09
Payer: MEDICARE

## 2025-07-09 PROBLEM — Z01.818 PREOPERATIVE CLEARANCE: Status: ACTIVE | Noted: 2025-07-09

## 2025-07-09 PROBLEM — S72.002A CLOSED FRACTURE OF LEFT HIP (HCC): Status: ACTIVE | Noted: 2025-07-09

## 2025-07-09 PROBLEM — I10 PRIMARY HYPERTENSION: Status: ACTIVE | Noted: 2025-07-09
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
## 2025-07-09 LAB
ANION GAP SERPL CALC-SCNC: 11 MEQ/L (ref 8–16)
BASOPHILS ABSOLUTE: 0 THOU/MM3 (ref 0–0.1)
BASOPHILS NFR BLD AUTO: 0.5 %
BUN SERPL-MCNC: 20 MG/DL (ref 8–23)
CALCIUM SERPL-MCNC: 9 MG/DL (ref 8.8–10.2)
CHLORIDE SERPL-SCNC: 105 MEQ/L (ref 98–111)
CO2 SERPL-SCNC: 22 MEQ/L (ref 22–29)
CREAT SERPL-MCNC: 0.8 MG/DL (ref 0.7–1.2)
DEPRECATED RDW RBC AUTO: 46.5 FL (ref 35–45)
EOSINOPHIL NFR BLD AUTO: 2.4 %
EOSINOPHILS ABSOLUTE: 0.2 THOU/MM3 (ref 0–0.4)
ERYTHROCYTE [DISTWIDTH] IN BLOOD BY AUTOMATED COUNT: 12.9 % (ref 11.5–14.5)
GFR SERPL CREATININE-BSD FRML MDRD: 88 ML/MIN/1.73M2
GLUCOSE SERPL-MCNC: 96 MG/DL (ref 74–109)
HCT VFR BLD AUTO: 41.7 % (ref 42–52)
HGB BLD-MCNC: 14 GM/DL (ref 14–18)
IMM GRANULOCYTES # BLD AUTO: 0.02 THOU/MM3 (ref 0–0.07)
IMM GRANULOCYTES NFR BLD AUTO: 0.2 %
LYMPHOCYTES ABSOLUTE: 1.2 THOU/MM3 (ref 1–4.8)
LYMPHOCYTES NFR BLD AUTO: 13.4 %
MAGNESIUM SERPL-MCNC: 2 MG/DL (ref 1.6–2.6)
MCH RBC QN AUTO: 32.3 PG (ref 26–33)
MCHC RBC AUTO-ENTMCNC: 33.6 GM/DL (ref 32.2–35.5)
MCV RBC AUTO: 96.1 FL (ref 80–94)
MONOCYTES ABSOLUTE: 1 THOU/MM3 (ref 0.4–1.3)
MONOCYTES NFR BLD AUTO: 11.9 %
NEUTROPHILS ABSOLUTE: 6.2 THOU/MM3 (ref 1.8–7.7)
NEUTROPHILS NFR BLD AUTO: 71.6 %
NRBC BLD AUTO-RTO: 0 /100 WBC
PHOSPHATE SERPL-MCNC: 2.5 MG/DL (ref 2.5–4.5)
PLATELET # BLD AUTO: 148 THOU/MM3 (ref 130–400)
PMV BLD AUTO: 9.6 FL (ref 9.4–12.4)
POTASSIUM SERPL-SCNC: 4 MEQ/L (ref 3.5–5.2)
RBC # BLD AUTO: 4.34 MILL/MM3 (ref 4.7–6.1)
SODIUM SERPL-SCNC: 138 MEQ/L (ref 135–145)
WBC # BLD AUTO: 8.7 THOU/MM3 (ref 4.8–10.8)

## 2025-07-09 PROCEDURE — 6370000000 HC RX 637 (ALT 250 FOR IP): Performed by: NURSE PRACTITIONER

## 2025-07-09 PROCEDURE — 7100000000 HC PACU RECOVERY - FIRST 15 MIN: Performed by: ORTHOPAEDIC SURGERY

## 2025-07-09 PROCEDURE — 3600000014 HC SURGERY LEVEL 4 ADDTL 15MIN: Performed by: ORTHOPAEDIC SURGERY

## 2025-07-09 PROCEDURE — 6360000002 HC RX W HCPCS: Performed by: ORTHOPAEDIC SURGERY

## 2025-07-09 PROCEDURE — 73502 X-RAY EXAM HIP UNI 2-3 VIEWS: CPT

## 2025-07-09 PROCEDURE — 2500000003 HC RX 250 WO HCPCS: Performed by: ORTHOPAEDIC SURGERY

## 2025-07-09 PROCEDURE — 2580000003 HC RX 258: Performed by: PHYSICIAN ASSISTANT

## 2025-07-09 PROCEDURE — 6360000002 HC RX W HCPCS: Performed by: NURSE PRACTITIONER

## 2025-07-09 PROCEDURE — 6360000002 HC RX W HCPCS: Performed by: PHYSICIAN ASSISTANT

## 2025-07-09 PROCEDURE — 84100 ASSAY OF PHOSPHORUS: CPT

## 2025-07-09 PROCEDURE — 2709999900 HC NON-CHARGEABLE SUPPLY: Performed by: ORTHOPAEDIC SURGERY

## 2025-07-09 PROCEDURE — 2500000003 HC RX 250 WO HCPCS: Performed by: NURSE PRACTITIONER

## 2025-07-09 PROCEDURE — 3600000004 HC SURGERY LEVEL 4 BASE: Performed by: ORTHOPAEDIC SURGERY

## 2025-07-09 PROCEDURE — 7100000001 HC PACU RECOVERY - ADDTL 15 MIN: Performed by: ORTHOPAEDIC SURGERY

## 2025-07-09 PROCEDURE — 2500000003 HC RX 250 WO HCPCS: Performed by: PHYSICIAN ASSISTANT

## 2025-07-09 PROCEDURE — 99232 SBSQ HOSP IP/OBS MODERATE 35: CPT | Performed by: INTERNAL MEDICINE

## 2025-07-09 PROCEDURE — 83735 ASSAY OF MAGNESIUM: CPT

## 2025-07-09 PROCEDURE — 3700000000 HC ANESTHESIA ATTENDED CARE: Performed by: ORTHOPAEDIC SURGERY

## 2025-07-09 PROCEDURE — 80048 BASIC METABOLIC PNL TOTAL CA: CPT

## 2025-07-09 PROCEDURE — C1776 JOINT DEVICE (IMPLANTABLE): HCPCS | Performed by: ORTHOPAEDIC SURGERY

## 2025-07-09 PROCEDURE — 36415 COLL VENOUS BLD VENIPUNCTURE: CPT

## 2025-07-09 PROCEDURE — 1200000000 HC SEMI PRIVATE

## 2025-07-09 PROCEDURE — 6360000002 HC RX W HCPCS: Performed by: NURSE ANESTHETIST, CERTIFIED REGISTERED

## 2025-07-09 PROCEDURE — 0SRB0JZ REPLACEMENT OF LEFT HIP JOINT WITH SYNTHETIC SUBSTITUTE, OPEN APPROACH: ICD-10-PCS | Performed by: ORTHOPAEDIC SURGERY

## 2025-07-09 PROCEDURE — 3700000001 HC ADD 15 MINUTES (ANESTHESIA): Performed by: ORTHOPAEDIC SURGERY

## 2025-07-09 PROCEDURE — 85025 COMPLETE CBC W/AUTO DIFF WBC: CPT

## 2025-07-09 PROCEDURE — 2500000003 HC RX 250 WO HCPCS: Performed by: NURSE ANESTHETIST, CERTIFIED REGISTERED

## 2025-07-09 DEVICE — IMPLANTABLE DEVICE
Type: IMPLANTABLE DEVICE | Site: HIP | Status: FUNCTIONAL
Brand: LOGICAL NEUTRAL LINER

## 2025-07-09 DEVICE — IMPLANTABLE DEVICE
Type: IMPLANTABLE DEVICE | Site: HIP | Status: FUNCTIONAL
Brand: ALTEON

## 2025-07-09 DEVICE — IMPLANTABLE DEVICE
Type: IMPLANTABLE DEVICE | Site: HIP | Status: FUNCTIONAL
Brand: SPARTAN STEM

## 2025-07-09 DEVICE — IMPLANTABLE DEVICE
Type: IMPLANTABLE DEVICE | Site: HIP | Status: FUNCTIONAL
Brand: LOGICAL G-SERIES

## 2025-07-09 DEVICE — IMPLANTABLE DEVICE
Type: IMPLANTABLE DEVICE | Site: HIP | Status: FUNCTIONAL
Brand: SIGNATURE FEMORAL HEAD

## 2025-07-09 DEVICE — COMPONENT TOT HIP CAPPED H2 ADV: Type: IMPLANTABLE DEVICE | Site: HIP | Status: FUNCTIONAL

## 2025-07-09 RX ORDER — LIDOCAINE HYDROCHLORIDE 20 MG/ML
INJECTION, SOLUTION INTRAVENOUS
Status: DISCONTINUED | OUTPATIENT
Start: 2025-07-09 | End: 2025-07-09 | Stop reason: SDUPTHER

## 2025-07-09 RX ORDER — TRANEXAMIC ACID 100 MG/ML
INJECTION, SOLUTION INTRAVENOUS
Status: DISCONTINUED | OUTPATIENT
Start: 2025-07-09 | End: 2025-07-09 | Stop reason: SDUPTHER

## 2025-07-09 RX ORDER — SODIUM CHLORIDE 0.9 % (FLUSH) 0.9 %
5-40 SYRINGE (ML) INJECTION PRN
Status: DISCONTINUED | OUTPATIENT
Start: 2025-07-09 | End: 2025-07-09 | Stop reason: HOSPADM

## 2025-07-09 RX ORDER — SODIUM CHLORIDE 9 MG/ML
INJECTION, SOLUTION INTRAVENOUS PRN
Status: DISCONTINUED | OUTPATIENT
Start: 2025-07-09 | End: 2025-07-09 | Stop reason: HOSPADM

## 2025-07-09 RX ORDER — VANCOMYCIN HYDROCHLORIDE 1 G/20ML
INJECTION, POWDER, LYOPHILIZED, FOR SOLUTION INTRAVENOUS PRN
Status: DISCONTINUED | OUTPATIENT
Start: 2025-07-09 | End: 2025-07-09 | Stop reason: ALTCHOICE

## 2025-07-09 RX ORDER — MIDAZOLAM HYDROCHLORIDE 1 MG/ML
INJECTION, SOLUTION INTRAMUSCULAR; INTRAVENOUS
Status: DISCONTINUED | OUTPATIENT
Start: 2025-07-09 | End: 2025-07-09 | Stop reason: SDUPTHER

## 2025-07-09 RX ORDER — METOPROLOL TARTRATE 1 MG/ML
INJECTION, SOLUTION INTRAVENOUS
Status: DISCONTINUED | OUTPATIENT
Start: 2025-07-09 | End: 2025-07-09 | Stop reason: SDUPTHER

## 2025-07-09 RX ORDER — SUCCINYLCHOLINE/SOD CL,ISO/PF 200MG/10ML
SYRINGE (ML) INTRAVENOUS
Status: DISCONTINUED | OUTPATIENT
Start: 2025-07-09 | End: 2025-07-09 | Stop reason: SDUPTHER

## 2025-07-09 RX ORDER — TRANEXAMIC ACID 100 MG/ML
INJECTION, SOLUTION INTRAVENOUS PRN
Status: DISCONTINUED | OUTPATIENT
Start: 2025-07-09 | End: 2025-07-09 | Stop reason: ALTCHOICE

## 2025-07-09 RX ORDER — FENTANYL CITRATE 50 UG/ML
50 INJECTION, SOLUTION INTRAMUSCULAR; INTRAVENOUS EVERY 5 MIN PRN
Status: DISCONTINUED | OUTPATIENT
Start: 2025-07-09 | End: 2025-07-09 | Stop reason: HOSPADM

## 2025-07-09 RX ORDER — ONDANSETRON 2 MG/ML
INJECTION INTRAMUSCULAR; INTRAVENOUS
Status: DISCONTINUED | OUTPATIENT
Start: 2025-07-09 | End: 2025-07-09 | Stop reason: SDUPTHER

## 2025-07-09 RX ORDER — ONDANSETRON 2 MG/ML
4 INJECTION INTRAMUSCULAR; INTRAVENOUS EVERY 6 HOURS PRN
Status: DISCONTINUED | OUTPATIENT
Start: 2025-07-09 | End: 2025-07-11 | Stop reason: HOSPADM

## 2025-07-09 RX ORDER — PROPOFOL 10 MG/ML
INJECTION, EMULSION INTRAVENOUS
Status: DISCONTINUED | OUTPATIENT
Start: 2025-07-09 | End: 2025-07-09 | Stop reason: SDUPTHER

## 2025-07-09 RX ORDER — SODIUM CHLORIDE 0.9 % (FLUSH) 0.9 %
5-40 SYRINGE (ML) INJECTION EVERY 12 HOURS SCHEDULED
Status: DISCONTINUED | OUTPATIENT
Start: 2025-07-09 | End: 2025-07-09 | Stop reason: HOSPADM

## 2025-07-09 RX ORDER — ROCURONIUM BROMIDE 10 MG/ML
INJECTION, SOLUTION INTRAVENOUS
Status: DISCONTINUED | OUTPATIENT
Start: 2025-07-09 | End: 2025-07-09 | Stop reason: SDUPTHER

## 2025-07-09 RX ORDER — HYDRALAZINE HYDROCHLORIDE 20 MG/ML
INJECTION INTRAMUSCULAR; INTRAVENOUS
Status: DISCONTINUED | OUTPATIENT
Start: 2025-07-09 | End: 2025-07-09 | Stop reason: SDUPTHER

## 2025-07-09 RX ORDER — EPHEDRINE SULFATE/0.9% NACL/PF 25 MG/5 ML
SYRINGE (ML) INTRAVENOUS
Status: DISCONTINUED | OUTPATIENT
Start: 2025-07-09 | End: 2025-07-09 | Stop reason: SDUPTHER

## 2025-07-09 RX ORDER — ONDANSETRON 2 MG/ML
4 INJECTION INTRAMUSCULAR; INTRAVENOUS
Status: DISCONTINUED | OUTPATIENT
Start: 2025-07-09 | End: 2025-07-09 | Stop reason: HOSPADM

## 2025-07-09 RX ORDER — DEXAMETHASONE SODIUM PHOSPHATE 4 MG/ML
INJECTION, SOLUTION INTRA-ARTICULAR; INTRALESIONAL; INTRAMUSCULAR; INTRAVENOUS; SOFT TISSUE
Status: DISCONTINUED | OUTPATIENT
Start: 2025-07-09 | End: 2025-07-09 | Stop reason: SDUPTHER

## 2025-07-09 RX ORDER — DIPHENHYDRAMINE HYDROCHLORIDE 50 MG/ML
12.5 INJECTION, SOLUTION INTRAMUSCULAR; INTRAVENOUS
Status: DISCONTINUED | OUTPATIENT
Start: 2025-07-09 | End: 2025-07-09 | Stop reason: HOSPADM

## 2025-07-09 RX ORDER — KETOROLAC TROMETHAMINE 30 MG/ML
15 INJECTION, SOLUTION INTRAMUSCULAR; INTRAVENOUS EVERY 6 HOURS
Status: DISCONTINUED | OUTPATIENT
Start: 2025-07-09 | End: 2025-07-11 | Stop reason: HOSPADM

## 2025-07-09 RX ORDER — FENTANYL CITRATE 50 UG/ML
INJECTION, SOLUTION INTRAMUSCULAR; INTRAVENOUS
Status: DISCONTINUED | OUTPATIENT
Start: 2025-07-09 | End: 2025-07-09 | Stop reason: SDUPTHER

## 2025-07-09 RX ADMIN — SUGAMMADEX 200 MG: 100 INJECTION, SOLUTION INTRAVENOUS at 15:30

## 2025-07-09 RX ADMIN — PROPOFOL 50 MG: 10 INJECTION, EMULSION INTRAVENOUS at 15:03

## 2025-07-09 RX ADMIN — PROPOFOL 50 MG: 10 INJECTION, EMULSION INTRAVENOUS at 14:56

## 2025-07-09 RX ADMIN — FENTANYL CITRATE 50 MCG: 50 INJECTION, SOLUTION INTRAMUSCULAR; INTRAVENOUS at 14:34

## 2025-07-09 RX ADMIN — FENTANYL CITRATE 50 MCG: 50 INJECTION, SOLUTION INTRAMUSCULAR; INTRAVENOUS at 15:03

## 2025-07-09 RX ADMIN — EPHEDRINE SULFATE 15 MG: 5 INJECTION INTRAVENOUS at 15:16

## 2025-07-09 RX ADMIN — ROCURONIUM BROMIDE 20 MG: 10 INJECTION, SOLUTION INTRAVENOUS at 15:01

## 2025-07-09 RX ADMIN — ONDANSETRON 4 MG: 2 INJECTION, SOLUTION INTRAMUSCULAR; INTRAVENOUS at 14:19

## 2025-07-09 RX ADMIN — MIDAZOLAM 2 MG: 1 INJECTION INTRAMUSCULAR; INTRAVENOUS at 14:33

## 2025-07-09 RX ADMIN — ATORVASTATIN CALCIUM 10 MG: 20 TABLET, FILM COATED ORAL at 17:53

## 2025-07-09 RX ADMIN — TRANEXAMIC ACID 1000 MG: 100 INJECTION, SOLUTION INTRAVENOUS at 14:54

## 2025-07-09 RX ADMIN — PROPOFOL 150 MG: 10 INJECTION, EMULSION INTRAVENOUS at 14:36

## 2025-07-09 RX ADMIN — ONDANSETRON 4 MG: 2 INJECTION INTRAMUSCULAR; INTRAVENOUS at 21:10

## 2025-07-09 RX ADMIN — Medication 140 MG: at 14:36

## 2025-07-09 RX ADMIN — CEFAZOLIN 2000 MG: 2 INJECTION, POWDER, FOR SOLUTION INTRAVENOUS at 14:03

## 2025-07-09 RX ADMIN — LISINOPRIL 10 MG: 10 TABLET ORAL at 17:54

## 2025-07-09 RX ADMIN — ROCURONIUM BROMIDE 30 MG: 10 INJECTION, SOLUTION INTRAVENOUS at 14:42

## 2025-07-09 RX ADMIN — Medication 100 MG: at 14:36

## 2025-07-09 RX ADMIN — SODIUM CHLORIDE: 0.9 INJECTION, SOLUTION INTRAVENOUS at 07:52

## 2025-07-09 RX ADMIN — LATANOPROST 1 DROP: 50 SOLUTION OPHTHALMIC at 21:00

## 2025-07-09 RX ADMIN — METOPROLOL TARTRATE 1 MG: 5 INJECTION INTRAVENOUS at 14:59

## 2025-07-09 RX ADMIN — FENTANYL CITRATE 50 MCG: 50 INJECTION, SOLUTION INTRAMUSCULAR; INTRAVENOUS at 14:56

## 2025-07-09 RX ADMIN — METOPROLOL TARTRATE 1 MG: 5 INJECTION INTRAVENOUS at 14:53

## 2025-07-09 RX ADMIN — HYDRALAZINE HYDROCHLORIDE 10 MG: 20 INJECTION INTRAMUSCULAR; INTRAVENOUS at 15:05

## 2025-07-09 RX ADMIN — DEXAMETHASONE SODIUM PHOSPHATE 8 MG: 4 INJECTION, SOLUTION INTRAMUSCULAR; INTRAVENOUS at 14:59

## 2025-07-09 RX ADMIN — EPHEDRINE SULFATE 10 MG: 5 INJECTION INTRAVENOUS at 15:21

## 2025-07-09 RX ADMIN — METOPROLOL TARTRATE 1 MG: 5 INJECTION INTRAVENOUS at 14:47

## 2025-07-09 RX ADMIN — WATER 2000 MG: 1 INJECTION INTRAMUSCULAR; INTRAVENOUS; SUBCUTANEOUS at 22:00

## 2025-07-09 ASSESSMENT — PAIN SCALES - WONG BAKER
WONGBAKER_NUMERICALRESPONSE: NO HURT

## 2025-07-09 ASSESSMENT — PAIN DESCRIPTION - DESCRIPTORS
DESCRIPTORS: ACHING
DESCRIPTORS: ACHING
DESCRIPTORS: ACHING;DULL

## 2025-07-09 ASSESSMENT — PAIN DESCRIPTION - PAIN TYPE: TYPE: ACUTE PAIN

## 2025-07-09 ASSESSMENT — PAIN SCALES - GENERAL
PAINLEVEL_OUTOF10: 0
PAINLEVEL_OUTOF10: 3
PAINLEVEL_OUTOF10: 0
PAINLEVEL_OUTOF10: 2
PAINLEVEL_OUTOF10: 4
PAINLEVEL_OUTOF10: 0

## 2025-07-09 ASSESSMENT — PAIN DESCRIPTION - LOCATION
LOCATION: HIP

## 2025-07-09 ASSESSMENT — PAIN DESCRIPTION - ORIENTATION
ORIENTATION: LEFT

## 2025-07-09 ASSESSMENT — PAIN - FUNCTIONAL ASSESSMENT
PAIN_FUNCTIONAL_ASSESSMENT: WONG-BAKER FACES
PAIN_FUNCTIONAL_ASSESSMENT: ACTIVITIES ARE NOT PREVENTED

## 2025-07-09 NOTE — PROGRESS NOTES
TRAUMA SBIRT attempted; patient not located in room; off unit. OUSMANE  to re-attempt at later time.

## 2025-07-09 NOTE — H&P
Orthopaedic H&P  Patient:  Ric Collins  YOB: 1945  MRN: [id number]     Acct: [id number]    PCP: Dariel Piper DO  Date of Admission: 7/8/2025  Date of Service: Pt seen/examined on 7/9/2025     Chief Complaint: L hip pain  History Of Present Illness: 80 y.o. male who presents with L lateral hip pain after a mechanical fall in his home, did not hit head, no loc, immediate pain and inability to weight bear, prompting evaluation in the ED. Imaging revealed a L femoral neck fracture for which orthopaedics was asked to admit. His pain is to the left lateral hip, non radiating, worsened with palpation and terminal ROM and weight bearing, relieved by immobilization, no paresthesias or weakness, no antecedent hip or thigh pain.     Baseline active and independent ADLs  No other msk concerns       Past Medical History:        Diagnosis Date    Arthritis     ankles and knees    Cancer (HCC)     prostate    Enlarged prostate     Epididymal cyst 2013    Hyperlipidemia     Primary hypertension 7/9/2025    Prostate nodule        Past Surgical History:        Procedure Laterality Date    ABDOMEN SURGERY      CATARACT REMOVAL      COLONOSCOPY      EYE SURGERY      detatched retina repaired right side;bilateral cateracts    KNEE SURGERY Right 07/2017    Torn Meniscus     PROSTATE SURGERY  09/14/2012 Dr Matt Zaldivar    Robotic Assisted Laproscopic Prostatectomy    RETINAL DETACHMENT SURGERY  1988    TONSILLECTOMY      VASECTOMY  1981       Home Medications:   Prior to Admission medications    Medication Sig Start Date End Date Taking? Authorizing Provider   lisinopril (PRINIVIL;ZESTRIL) 10 MG tablet  8/7/19  Yes ProviderShivani MD   latanoprost (XALATAN) 0.005 % ophthalmic solution 1 drop nightly   Yes Provider, MD Shivani   Lutein 6 MG CAPS Take by mouth   Yes ProviderShivani MD   simvastatin (ZOCOR) 20 MG tablet Take 1 tablet by mouth nightly   Yes Provider, MD Shivani   loratadine

## 2025-07-09 NOTE — PROGRESS NOTES
1545: Pt arrives to pacu. Non reactive post anesthesia. OPA in place with 6L NC. Respirations easy and unlabored. Surgical incision to left hip with prineo dressing in place. Edges well approximated and no drainage noted. Ice applied to site. Rangel-Baker FACES 0. VSS.     1550: XR at pt bedside.     1555: Pt non reactive post anesthesia. OPA in place. Respirations easy and unlabored. Rangel-Lopez FACES 0. VSS.     1605: Pt non reactive post anesthesia. OPA in place. Respirations easy and unlabored. Rangel-Lopez FACES 0. VSS.     1618: OPA removed. Respirations easy and unlabored. Pt denies pain and nausea at this time.     1620: Pt resting in bed with eyes closed. Respirations easy and unlabored. Pt awakens easily to voice. Denies any pain/nausea. VSS.     1625: Phone call to 7K. Spoke with nurse Rahman and report given.     1638: Pt meets criteria for pacu discharge. Awaiting transport.     1645: Pt resting in bed with eyes closed and respirations easy and unlabored. VSS.     1656: Pt transported to [facility] in stable condition. VSS.

## 2025-07-09 NOTE — PROGRESS NOTES
Hospitalist Progress Note  Internal Medicine Resident      Patient: Ric Collins 80 y.o. male      Unit/Bed: 7K-25/025-A    Admit Date: 7/8/2025      ASSESSMENT AND PLAN  Acute Left Femoral Neck Fracture: XR Pelvis/Left Femur: Left femoral neck fracture. CT Left Hip: Left femoral neck fracture.   Orthopedics is primary and management per ortho   Plan for surgical intervention today per Ortho     Preoperative Risk assessment According to the RCRI score is 0 due to having a negative for high risk surgery, history of ischemic heart disease, history of heart failure, history of CVA, pre-operative treatment with insulin, and pre-operative creatinine greater than 2. >4 MET's w/o symptoms (4 METs: Can walk up a flight of steps or a hill or walk on level ground at 3 to 4 mph) Yes  Ric Collins is at a 3.9% risk for cardiac complications during intermediate risk procedure and wishes to continue as planned without further cardiac testing.      Mechanical Fall: Reports ambulating and turned too fast and tripped over his shoe, falling onto his left side. CT Cspine: no acute findings.   Fall precautions  PT/OT to eval and treat when Ortho clears     Left renal Calculus: 6 mm, non-obstructive, noted on CT A/P, Continue to monitor  HTN:   Continue lisinopril with holding parameters     HLD:   Continue atorvastatin      LDA: []CVC / []PICC / []Midline / []Jacome / []Drains / []Mediport / []None  Antibiotics: No  Steroids: No  Labs (still needed?): [x]Yes / []No  IVF (still needed?): []Yes / []No    Level of care: []Step Down / [x]Med-Surg  Bed Status: [x]Inpatient / []Observation  Telemetry: []Yes / [x]No  PT/OT: [x]Yes / []No    DVT Prophylaxis: [] Lovenox / [] Heparin / [] SCDs / [] Already on Systemic Anticoagulation / [x] None     Expected discharge date:  TBD  Disposition: TBD     Code status: Full Code     ===================================================================    Chief Complaint: Fall  Subjective (past 24

## 2025-07-09 NOTE — ANESTHESIA POSTPROCEDURE EVALUATION
Department of Anesthesiology  Postprocedure Note    Patient: Ric Collins  MRN: [id number]  YOB: 1945  Date of evaluation: 7/9/2025    Procedure Summary       Date: 07/09/25 Room / Location: [facility] OR [address] [place] [address] OR    Anesthesia Start: 1433 Anesthesia Stop: 1549    Procedure: Left Total Hip Arthroplasty (Left: Hip) Diagnosis:       Left hip pain      (Left hip pain [M25.552])    Surgeons: Dariel Marion MD Responsible Provider: Kye Amaya DO    Anesthesia Type: general ASA Status: 2            Anesthesia Type: No value filed.    Marlene Phase I: Marlene Score: 9    Marlene Phase II:      Anesthesia Post Evaluation    Patient location during evaluation: PACU  Patient participation: complete - patient participated  Level of consciousness: awake  Airway patency: patent  Nausea & Vomiting: no nausea  Cardiovascular status: hemodynamically stable  Respiratory status: acceptable  Hydration status: stable  Pain management: adequate    No notable events documented.

## 2025-07-09 NOTE — PLAN OF CARE
Problem: Safety - Adult  Goal: Free from fall injury  7/9/2025 0116 by Cali Connolly RN  Outcome: Progressing  7/9/2025 0116 by Cali Connolly RN  Outcome: Progressing     Problem: Discharge Planning  Goal: Discharge to home or other facility with appropriate resources  7/9/2025 0116 by Cali Connolly RN  Outcome: Progressing  7/9/2025 0116 by Cali Connolly RN  Outcome: Progressing     Problem: Pain  Goal: Verbalizes/displays adequate comfort level or baseline comfort level  7/9/2025 0116 by Cali Connolly RN  Outcome: Progressing  7/9/2025 0116 by Cali Connolly RN  Outcome: Progressing

## 2025-07-09 NOTE — ANESTHESIA PRE PROCEDURE
Department of Anesthesiology  Preprocedure Note       Name:  Ric Collins   Age:  80 y.o.  :  1945                                          MRN:  [id number]         Date:  2025      Surgeon: Surgeon(s):  Dariel Marion MD    Procedure: Procedure(s):  Left Total Hip Arthroplasty    Medications prior to admission:   Prior to Admission medications    Medication Sig Start Date End Date Taking? Authorizing Provider   lisinopril (PRINIVIL;ZESTRIL) 10 MG tablet  19  Yes ProviderShivani MD   latanoprost (XALATAN) 0.005 % ophthalmic solution 1 drop nightly   Yes Provider, MD Shivani   Lutein 6 MG CAPS Take by mouth   Yes ProviderShivani MD   simvastatin (ZOCOR) 20 MG tablet Take 1 tablet by mouth nightly   Yes ProviderShivani MD   loratadine (CLARITIN) 10 MG capsule Take 10 mg by mouth daily  Patient not taking: Reported on 2025    ProviderShivani MD       Current medications:    Current Facility-Administered Medications   Medication Dose Route Frequency Provider Last Rate Last Admin   • ceFAZolin (ANCEF) 2,000 mg in sterile water 20 mL IV syringe  2,000 mg IntraVENous On Call to OR Ivana Garcia PA-C       • latanoprost (XALATAN) 0.005 % ophthalmic solution 1 drop  1 drop Both Eyes Nightly Jonathan Marley PA-C       • lisinopril (PRINIVIL;ZESTRIL) tablet 10 mg  10 mg Oral Daily Alondra Joseph, APRN - CNP       • atorvastatin (LIPITOR) tablet 10 mg  10 mg Oral Daily Jonathan Marley PA-C       • cetirizine (ZYRTEC) tablet 5 mg  5 mg Oral Daily Jonathan Marley PA-C       • 0.9 % sodium chloride infusion   IntraVENous Continuous Jonathan Marley PA-C 100 mL/hr at 25 0752 New Bag at 25 0752   • HYDROcodone-acetaminophen (NORCO) 5-325 MG per tablet 2 tablet  2 tablet Oral Q4H PRN Jonathan Marley PA-C   2 tablet at 25 2201   • morphine (PF) injection 2 mg  2 mg IntraVENous Q2H PRN Jonathan Marley PA-C           Allergies:  No Known

## 2025-07-09 NOTE — CARE COORDINATION
Case Management Assessment Initial Evaluation    Date/Time of Evaluation: 2025 1:26 PM  Assessment Completed by: Georgia Reyes RN    If patient is discharged prior to next notation, then this note serves as note for discharge by case management.    Patient Name: Ric Collins                   YOB: 1945  Diagnosis: Closed fracture of left hip, initial encounter ([facility]) [S72.002A]  Fall, initial encounter [W19.XXXA]  Hip fracture requiring operative repair, left, closed, initial encounter ([facility]) [S72.002A]                   Date / Time: 2025  4:31 PM  [facility]     Patient Admission Status: Inpatient   Readmission Risk Low 0-14, Mod 15-19), High > 20: Readmission Risk Score: 8.7    Current PCP: Dariel Piper DO  Health Care Decision Makers:     Additional Case Management Notes: Admitted after a fall at home with L femoral neck fracture. OR today for total hip. NPO. PT/OT post-op.    Procedures:    OR for left total hip arthroplasty.     Imagin/8 CXR: Mild left lower lobe atelectasis.   L Femur XR: Left femoral neck fracture.       Patient Goals/Plan/Treatment Preferences: Ric plans to return home with his wife at discharge. OP vs HH therapy. PT/OT to eval post-op.     Family would like a bedside commode to use as toilet riser, a hip kit ($38) and a rollator ($42.74) through [facility] day of discharge. Relayed prices to family as they plan to pay in cash. Will need orders for all.     25 1136   Service Assessment   Patient Orientation Alert and Oriented   Cognition Alert   History Provided By Patient   Primary Caregiver Self   Accompanied By/Relationship family   Support Systems Spouse/Significant Other;Children;Family Members   Patient's Healthcare Decision Maker is: Legal Next of Kin   PCP Verified by CM Yes   Last Visit to PCP Within last 3 months   Prior Functional Level Independent in ADLs/IADLs   Current Functional Level Other (see comment)  (tbd)   Can  patient return to prior living arrangement Yes   Ability to make needs known: Good   Family able to assist with home care needs: Yes   Would you like for me to discuss the discharge plan with any other family members/significant others, and if so, who? Yes  (family at the bedside)   Financial Resources Medicare   Community Resources None   Social/Functional History   Active  Yes   Discharge Planning   Type of Residence House   Living Arrangements Spouse/Significant Other   Current Services Prior To Admission None   Potential Assistance Needed Outpatient PT/OT;Home Care   DME Ordered? No   Potential Assistance Purchasing Medications No   Type of Home Care Services None   Patient expects to be discharged to: House   Services At/After Discharge   Mode of Transport at Discharge Other (see comment)  (family)   Confirm Follow Up Transport Family   Condition of Participation: Discharge Planning   The Plan for Transition of Care is related to the following treatment goals: OR today

## 2025-07-09 NOTE — DISCHARGE INSTRUCTIONS
DR. MARION DISCHARGE INSTRUCTIONS  Dr. Dariel Marion MD       ACTIVITY / WEIGHTBEARING  INSTRUCTIONS:  Weightbearing as tolerated surgical extremity.   PT/OT if ordered.    DIET:  Increase Fluid/Water intake, eat foods high in fiber, fruits and vegetables to help to prevent constipation.    WOUND/DRESSING INSTRUCTIONS:  Always ensure you wash your hands before and after caring for your wound/dressing.  Keep your dressing clean and dry. Change dressing as needed using dry gauze.  Can wash around incision.     If prineo (mesh tape dressing) in place, this may be removed after 3 weeks from surgery.   You may shower with prineo dressing if no active drainage coming from incision.   Do not let shower water directly hit the incision. Dry completely without rubbing.     Do not place antibiotic ointment, lotion, creams on surgical incision.  Smoking impairs adequate wound healing.  If smoking, consider quitting and decreasing.  If diabetic, keeping blood glucose levels in acceptable range will limit complications.   May apply ice to surgical incision to help to prevent swelling. Apply 20 minutes at a time, as needed.      MEDICATION INSTRUCTIONS:  Take pain medication as prescribed.  Decrease as tolerable.   See orders regarding resuming your home medications and any new medications.  Continue blood thinner if ordered by your physician.   Wear compression stocking as directed, this will help reduce swelling and blood clots. Wear on during the day, may remove at bedtime. Wear until follow-up unless otherwise directed.     FOLLOW-UP CARE:  If a follow-up appointment was not made for you at discharge, call [phone] ([place] office) or [phone] ([place] office) to schedule an appointment for 8 weeks.    Call Dr Marion's office with any concerns.     Signs of infection such as fever, chills, redness, pus, or bad smelling discharge from incision site.     Excessive bleeding, swelling, increasing pain, or discharge around

## 2025-07-10 LAB
ANION GAP SERPL CALC-SCNC: 13 MEQ/L (ref 8–16)
BASOPHILS ABSOLUTE: 0 THOU/MM3 (ref 0–0.1)
BASOPHILS NFR BLD AUTO: 0.1 %
BUN SERPL-MCNC: 21 MG/DL (ref 8–23)
CALCIUM SERPL-MCNC: 8.7 MG/DL (ref 8.8–10.2)
CHLORIDE SERPL-SCNC: 102 MEQ/L (ref 98–111)
CO2 SERPL-SCNC: 20 MEQ/L (ref 22–29)
CREAT SERPL-MCNC: 1 MG/DL (ref 0.7–1.2)
DEPRECATED RDW RBC AUTO: 47 FL (ref 35–45)
EOSINOPHIL NFR BLD AUTO: 0.1 %
EOSINOPHILS ABSOLUTE: 0 THOU/MM3 (ref 0–0.4)
ERYTHROCYTE [DISTWIDTH] IN BLOOD BY AUTOMATED COUNT: 13.1 % (ref 11.5–14.5)
GFR SERPL CREATININE-BSD FRML MDRD: 75 ML/MIN/1.73M2
GLUCOSE SERPL-MCNC: 168 MG/DL (ref 74–109)
HCT VFR BLD AUTO: 39.2 % (ref 42–52)
HGB BLD-MCNC: 12.9 GM/DL (ref 14–18)
IMM GRANULOCYTES # BLD AUTO: 0.08 THOU/MM3 (ref 0–0.07)
IMM GRANULOCYTES NFR BLD AUTO: 0.5 %
LYMPHOCYTES ABSOLUTE: 0.8 THOU/MM3 (ref 1–4.8)
LYMPHOCYTES NFR BLD AUTO: 5.7 %
MCH RBC QN AUTO: 31.9 PG (ref 26–33)
MCHC RBC AUTO-ENTMCNC: 32.9 GM/DL (ref 32.2–35.5)
MCV RBC AUTO: 97 FL (ref 80–94)
MONOCYTES ABSOLUTE: 1.6 THOU/MM3 (ref 0.4–1.3)
MONOCYTES NFR BLD AUTO: 10.6 %
NEUTROPHILS ABSOLUTE: 12.3 THOU/MM3 (ref 1.8–7.7)
NEUTROPHILS NFR BLD AUTO: 83 %
NRBC BLD AUTO-RTO: 0 /100 WBC
PLATELET # BLD AUTO: 155 THOU/MM3 (ref 130–400)
PMV BLD AUTO: 9.5 FL (ref 9.4–12.4)
POTASSIUM SERPL-SCNC: 4.5 MEQ/L (ref 3.5–5.2)
RBC # BLD AUTO: 4.04 MILL/MM3 (ref 4.7–6.1)
SODIUM SERPL-SCNC: 135 MEQ/L (ref 135–145)
WBC # BLD AUTO: 14.8 THOU/MM3 (ref 4.8–10.8)

## 2025-07-10 PROCEDURE — 6370000000 HC RX 637 (ALT 250 FOR IP): Performed by: PHYSICIAN ASSISTANT

## 2025-07-10 PROCEDURE — 36415 COLL VENOUS BLD VENIPUNCTURE: CPT

## 2025-07-10 PROCEDURE — 97530 THERAPEUTIC ACTIVITIES: CPT

## 2025-07-10 PROCEDURE — 80048 BASIC METABOLIC PNL TOTAL CA: CPT

## 2025-07-10 PROCEDURE — 2500000003 HC RX 250 WO HCPCS: Performed by: NURSE PRACTITIONER

## 2025-07-10 PROCEDURE — 97166 OT EVAL MOD COMPLEX 45 MIN: CPT

## 2025-07-10 PROCEDURE — 6360000002 HC RX W HCPCS: Performed by: NURSE PRACTITIONER

## 2025-07-10 PROCEDURE — 97162 PT EVAL MOD COMPLEX 30 MIN: CPT

## 2025-07-10 PROCEDURE — 97116 GAIT TRAINING THERAPY: CPT

## 2025-07-10 PROCEDURE — 1200000000 HC SEMI PRIVATE

## 2025-07-10 PROCEDURE — 85025 COMPLETE CBC W/AUTO DIFF WBC: CPT

## 2025-07-10 PROCEDURE — 99232 SBSQ HOSP IP/OBS MODERATE 35: CPT | Performed by: INTERNAL MEDICINE

## 2025-07-10 PROCEDURE — 6370000000 HC RX 637 (ALT 250 FOR IP): Performed by: NURSE PRACTITIONER

## 2025-07-10 RX ORDER — CYCLOBENZAPRINE HCL 10 MG
10 TABLET ORAL 3 TIMES DAILY PRN
Status: DISCONTINUED | OUTPATIENT
Start: 2025-07-10 | End: 2025-07-11 | Stop reason: HOSPADM

## 2025-07-10 RX ORDER — PANTOPRAZOLE SODIUM 40 MG/1
40 TABLET, DELAYED RELEASE ORAL
Status: DISCONTINUED | OUTPATIENT
Start: 2025-07-10 | End: 2025-07-11 | Stop reason: HOSPADM

## 2025-07-10 RX ORDER — HYDROCODONE BITARTRATE AND ACETAMINOPHEN 5; 325 MG/1; MG/1
1 TABLET ORAL EVERY 6 HOURS PRN
Status: DISCONTINUED | OUTPATIENT
Start: 2025-07-10 | End: 2025-07-11 | Stop reason: HOSPADM

## 2025-07-10 RX ORDER — HYDROCODONE BITARTRATE AND ACETAMINOPHEN 5; 325 MG/1; MG/1
2 TABLET ORAL EVERY 4 HOURS PRN
Status: DISCONTINUED | OUTPATIENT
Start: 2025-07-10 | End: 2025-07-10

## 2025-07-10 RX ORDER — PSEUDOEPHEDRINE HCL 30 MG
100 TABLET ORAL 2 TIMES DAILY
Qty: 20 CAPSULE | Refills: 0 | Status: SHIPPED | OUTPATIENT
Start: 2025-07-10 | End: 2025-07-20

## 2025-07-10 RX ORDER — CYCLOBENZAPRINE HCL 10 MG
10 TABLET ORAL 3 TIMES DAILY PRN
Qty: 30 TABLET | Refills: 0 | Status: SHIPPED | OUTPATIENT
Start: 2025-07-10 | End: 2025-07-16

## 2025-07-10 RX ORDER — HYDROCODONE BITARTRATE AND ACETAMINOPHEN 5; 325 MG/1; MG/1
2 TABLET ORAL EVERY 6 HOURS PRN
Status: DISCONTINUED | OUTPATIENT
Start: 2025-07-10 | End: 2025-07-11 | Stop reason: HOSPADM

## 2025-07-10 RX ORDER — HYDROCODONE BITARTRATE AND ACETAMINOPHEN 5; 325 MG/1; MG/1
1 TABLET ORAL EVERY 6 HOURS PRN
Qty: 28 TABLET | Refills: 0 | Status: SHIPPED | OUTPATIENT
Start: 2025-07-10 | End: 2025-07-16

## 2025-07-10 RX ORDER — HYDROCODONE BITARTRATE AND ACETAMINOPHEN 5; 325 MG/1; MG/1
1 TABLET ORAL EVERY 4 HOURS PRN
Status: DISCONTINUED | OUTPATIENT
Start: 2025-07-10 | End: 2025-07-10

## 2025-07-10 RX ORDER — SENNOSIDES 8.6 MG/1
1 TABLET ORAL NIGHTLY
Status: DISCONTINUED | OUTPATIENT
Start: 2025-07-10 | End: 2025-07-11 | Stop reason: HOSPADM

## 2025-07-10 RX ORDER — DOCUSATE SODIUM 100 MG/1
100 CAPSULE, LIQUID FILLED ORAL 2 TIMES DAILY
Status: DISCONTINUED | OUTPATIENT
Start: 2025-07-10 | End: 2025-07-11 | Stop reason: HOSPADM

## 2025-07-10 RX ADMIN — APIXABAN 2.5 MG: 2.5 TABLET, FILM COATED ORAL at 20:35

## 2025-07-10 RX ADMIN — LATANOPROST 1 DROP: 50 SOLUTION OPHTHALMIC at 20:35

## 2025-07-10 RX ADMIN — PANTOPRAZOLE SODIUM 40 MG: 40 TABLET, DELAYED RELEASE ORAL at 06:49

## 2025-07-10 RX ADMIN — ATORVASTATIN CALCIUM 10 MG: 20 TABLET, FILM COATED ORAL at 08:43

## 2025-07-10 RX ADMIN — APIXABAN 2.5 MG: 2.5 TABLET, FILM COATED ORAL at 08:43

## 2025-07-10 RX ADMIN — LISINOPRIL 10 MG: 10 TABLET ORAL at 08:44

## 2025-07-10 RX ADMIN — WATER 2000 MG: 1 INJECTION INTRAMUSCULAR; INTRAVENOUS; SUBCUTANEOUS at 05:58

## 2025-07-10 ASSESSMENT — PAIN SCALES - GENERAL
PAINLEVEL_OUTOF10: 0
PAINLEVEL_OUTOF10: 0

## 2025-07-10 NOTE — PROGRESS NOTES
[facility]  INPATIENT OCCUPATIONAL THERAPY  [facility] ORTHOPEDICS 7K  EVALUATION      Discharge Recommendations: Patient would benefit from continued therapy after discharge  Equipment Recommendations:          Time In: 1135  Time Out: 1158  Timed Code Treatment Minutes: 14 Minutes  Minutes: 23          Date: 7/10/2025  Patient Name: Ric Collins,   Gender: male      MRN: [id number]  : 1945  (80 y.o.)  Referring Practitioner: Fabby Sevilla APRN - CNP  Diagnosis: Hip fracture requiring operative repair, left, closed, initial encounter (HCC)  Additional Pertinent Hx: per chart review;  \"Ric Collins is a 80 y.o. male who presents to the emergency department from home, brought in by EMS for evaluation of mechanical fall.  The patient had a mechanical approximately 1 hour prior to arrival.  He reports this.  Mechanical in nature.  He was walking, turned too fast, and slipped over his shoe.  This resulted in a fall to his left side.  The patient denies any loss of consciousness, did not hit his head.  Does not take anticoagulation.  He was able to get off the floor due to the pain he is having in his left femur region.  His family did help him stand up, and he was able to bear weight, but he states the left pain was too severe, so he called EMS to bring him in.  The patient did not take anything prior to arrival for pain, but there is not requiring anything currently.  The family denies him being confused since the fall.  The patient does not have any prosthetic hips.  The patient is only complaining of pain in his left hip and left femur region, no other point tenderness on physical exam.  No evidence of skin abrasions anywhere.  The patient has no prodrome of chest pain/pressure/tightness or shortness of breath.  He currently does not have any of those symptoms either.  The patient has no other acute complaints at this time.\"   patient underwent a Left Total Hip Arthroplasty on

## 2025-07-10 NOTE — PROGRESS NOTES
Ric Collins was evaluated today and a DME order was entered for a wheeled walker with seat because he requires this to successfully complete daily living tasks of personal cares and ambulating.  A wheeled walker with seat is necessary due to the patient's unsteady gait, upper body weakness, inability to  and ambulation device, ambulating only short distances by pushing a walker, and the need to sit for a short time before resuming ambulation.  These tasks cannot be completed with a lesser ambulation device such as a cane, crutch, or standard walker.  The need for this equipment was discussed with the patient and he understands and is in agreement.

## 2025-07-10 NOTE — PROGRESS NOTES
Hospitalist Progress Note  Internal Medicine Resident      Patient: Ric Collins 80 y.o. male      Unit/Bed: 7K-25/025-A    Admit Date: 7/8/2025      ASSESSMENT AND PLAN  Acute Left Femoral Neck Fracture: XR Pelvis/Left Femur: Left femoral neck fracture. CT Left Hip: Left femoral neck fracture.   Orthopedics is primary and management per ortho   Status post left total hip arthroplasty, denies any severe pain, plan for PT/OT, discharge planning per primary/orthopedic.       2. Mechanical Fall: Reports ambulating and turned too fast and tripped over his shoe, falling onto his left side. CT Cspine: no acute findings.   Fall precautions  PT/OT on board     3. Left renal Calculus: 6 mm, non-obstructive, noted on CT A/P, Continue to monitor  HTN:   Continue lisinopril with holding parameters     HLD:   Continue atorvastatin    Patient is medically stable, Internal medicine will sign off, repeat CBC in 1 week post-discharge Discharge plan per primary/ Ortho, Please call for questions.  Thank you for the consult    LDA: []CVC / []PICC / []Midline / []Jacome / []Drains / []Mediport / []None  Antibiotics: No  Steroids: No  Labs (still needed?): [x]Yes / []No  IVF (still needed?): []Yes / []No    Level of care: []Step Down / [x]Med-Surg  Bed Status: [x]Inpatient / []Observation  Telemetry: []Yes / [x]No  PT/OT: [x]Yes / []No    DVT Prophylaxis: [] Lovenox / [] Heparin / [] SCDs / [] Already on Systemic Anticoagulation / [x] None     Expected discharge date:  TBD  Disposition: TBD     Code status: Full Code     ===================================================================    Chief Complaint: Fall  Subjective (past 24 hours):   Patient was seen and examined this morning, no acute events reported overnight.  Patient is status post left total hip arthroplasty.  Patient complaining of discomfort at the surgical site when moving but denies any active pain.  Patient denies any nausea, vomiting, abdominal pain, headache,

## 2025-07-10 NOTE — CARE COORDINATION
7/10/25, 1:50 PM EDT    Patient goals/plan/ treatment preferences discussed by  and .  Patient goals/plan/ treatment preferences reviewed with patient/ family.  Patient/ family verbalize understanding of discharge plan and are in agreement with goal/plan/treatment preferences.  Understanding was demonstrated using the teach back method.  AVS provided by RN at time of discharge, which includes all necessary medical information pertaining to the patients current course of illness, treatment, post-discharge goals of care, and treatment preferences.     Services At/After Discharge: DME, Outpatient, and PT     Pt now decided to do OP PT close to [place]; daughter-in-law will assist with finding this.  Pt insurance not contracted with  DME; order printed off and handed to KOMAL-I-L who plans to go get RW at [facility] at their request.        Pt verbalized understanding and gave permission for possible discharge within 4 hours of receiving IMM.

## 2025-07-10 NOTE — PROGRESS NOTES
[facility]  INPATIENT PHYSICAL THERAPY  EVALUATION  [facility] ORTHOPEDICS 7K - 7K-25/025-A    Discharge Recommendations: Continue to assess pending progress, Outpatient PT  Equipment Recommendations: Yes     Mobility Devices: Walker  Walker: Rolling, Rollator (4 Wheeled)    Time In: 1031  Time Out: 1117  Timed Code Treatment Minutes: 38 Minutes  Minutes: 46          Date: 7/10/2025  Patient Name: Ric Collins,  Gender:  male        MRN: [id number]  : 1945  (80 y.o.)      Referring Practitioner: Fabby Sevilla APRN - CNP  Diagnosis: Hip fracture requiring operative repair, left, closed, initial encounter (HCC)  Additional Pertinent Hx: Per EMR \"80 y.o. male who presents with L lateral hip pain after a mechanical fall in his home, did not hit head, no loc, immediate pain and inability to weight bear, prompting evaluation in the ED. Imaging revealed a L femoral neck fracture for which orthopaedics was asked to admit. His pain is to the left lateral hip, non radiating, worsened with palpation and terminal ROM and weight bearing, relieved by immobilization, no paresthesias or weakness, no antecedent hip or thigh pain.\" Pt is s/p L PRISCA completed by Dr Marion on .     Restrictions/Precautions:  Restrictions/Precautions: Fall Risk, Surgical Protocols, Weight Bearing  Left Lower Extremity Weight Bearing: Weight Bearing As Tolerated    Hip Precautions: No hip flexion > 90 degrees, No hip internal rotation, No ADduction    Required Braces or Orthoses?: No      Subjective:  Chart Reviewed: Yes  Patient assessed for rehabilitation services?: Yes  Family/Caregiver Present: No  Subjective: OK to see pt per nursing. Pt in bedside chair when PT arrived, pleasant and agreeable to PT session.    General:  Overall Orientation Status: Within Normal Limits  Orientation Level: Oriented X4  Vision: Within Functional Limits  Hearing: Within Functional Limits       Pain: mild pain /10: L hip    Vitals: Vitals not

## 2025-07-10 NOTE — PROGRESS NOTES
Orthopaedic Progress Note      SUBJECTIVE   Mr. Collins is post op day # 1 L PRISCA    Seen at bedside this morning  no adverse events overnight  Pain well controlled, tolerating oral diet  Denies any numbness/paresthesia in operative extremity  To mobilize today       OBJECTIVE      Physical    VITALS:  /83   Pulse 73   Temp 97.8 °F (36.6 °C) (Oral)   Resp 16   Ht 1.829 m (6')   Wt 84.4 kg (186 lb)   SpO2 95%   BMI 25.23 kg/m²   I/O last 3 completed shifts:  In: 1100 [P.O.:300; I.V.:800]  Out: 950 [Urine:950]    5/10 pain  Gen: alert and oriented  Head: normorcephalic, atraumatic  Resp: unlabored, room air  Pelvis: stable  LLE prineo intact, no drainage, no bandage saturation  Sensation to light touch intact  Gastroc TA EHL intact  Distal pulses palpable, warm well perfused  Calf supple nontender to palpation       Data  CBC:   Lab Results   Component Value Date/Time    WBC 8.7 07/09/2025 07:43 AM    HGB 14.0 07/09/2025 07:43 AM     07/09/2025 07:43 AM     BMP:    Lab Results   Component Value Date/Time     07/09/2025 07:43 AM    K 4.0 07/09/2025 07:43 AM     07/09/2025 07:43 AM    CO2 22 07/09/2025 07:43 AM    BUN 20 07/09/2025 07:43 AM    CREATININE 0.8 07/09/2025 07:43 AM    CALCIUM 9.0 07/09/2025 07:43 AM    GLUCOSE 96 07/09/2025 07:43 AM     Uric Acid:  No components found for: \"URIC\"  PT/INR:  No results found for: \"PROTIME\", \"INR\"  Troponin:    Lab Results   Component Value Date/Time    TROPONINI <0.006 07/05/2013 08:58 PM     Urine Culture:  No components found for: \"CURINE\"      Current Inpatient Medications    Current Facility-Administered Medications: ketorolac (TORADOL) injection 15 mg, 15 mg, IntraVENous, Q6H  ondansetron (ZOFRAN) injection 4 mg, 4 mg, IntraVENous, Q6H PRN  apixaban (ELIQUIS) tablet 2.5 mg, 2.5 mg, Oral, BID  latanoprost (XALATAN) 0.005 % ophthalmic solution 1 drop, 1 drop, Both Eyes, Nightly  lisinopril (PRINIVIL;ZESTRIL) tablet 10 mg, 10 mg, Oral,

## 2025-07-10 NOTE — PLAN OF CARE
Problem: Safety - Adult  Goal: Free from fall injury  Outcome: Progressing  Flowsheets (Taken 7/10/2025 1339)  Free From Fall Injury: Instruct family/caregiver on patient safety     Problem: Discharge Planning  Goal: Discharge to home or other facility with appropriate resources  Outcome: Progressing  Flowsheets (Taken 7/10/2025 1339)  Discharge to home or other facility with appropriate resources: Identify barriers to discharge with patient and caregiver     Problem: Pain  Goal: Verbalizes/displays adequate comfort level or baseline comfort level  Outcome: Progressing  Flowsheets (Taken 7/10/2025 1339)  Verbalizes/displays adequate comfort level or baseline comfort level:   Encourage patient to monitor pain and request assistance   Assess pain using appropriate pain scale   Administer analgesics based on type and severity of pain and evaluate response   Implement non-pharmacological measures as appropriate and evaluate response   Care plan reviewed with patient and verbalize understanding of the plan of care and contribute to goal setting.

## 2025-07-11 VITALS
HEIGHT: 72 IN | TEMPERATURE: 98.6 F | BODY MASS INDEX: 25.19 KG/M2 | WEIGHT: 186 LBS | SYSTOLIC BLOOD PRESSURE: 111 MMHG | DIASTOLIC BLOOD PRESSURE: 70 MMHG | RESPIRATION RATE: 18 BRPM | OXYGEN SATURATION: 97 % | HEART RATE: 96 BPM

## 2025-07-11 LAB
ANION GAP SERPL CALC-SCNC: 13 MEQ/L (ref 8–16)
BASOPHILS ABSOLUTE: 0.1 THOU/MM3 (ref 0–0.1)
BASOPHILS NFR BLD AUTO: 0.5 %
BUN SERPL-MCNC: 18 MG/DL (ref 8–23)
CALCIUM SERPL-MCNC: 9.1 MG/DL (ref 8.8–10.2)
CHLORIDE SERPL-SCNC: 99 MEQ/L (ref 98–111)
CO2 SERPL-SCNC: 22 MEQ/L (ref 22–29)
CREAT SERPL-MCNC: 0.9 MG/DL (ref 0.7–1.2)
DEPRECATED RDW RBC AUTO: 46.5 FL (ref 35–45)
EOSINOPHIL NFR BLD AUTO: 2.3 %
EOSINOPHILS ABSOLUTE: 0.3 THOU/MM3 (ref 0–0.4)
ERYTHROCYTE [DISTWIDTH] IN BLOOD BY AUTOMATED COUNT: 13.2 % (ref 11.5–14.5)
GFR SERPL CREATININE-BSD FRML MDRD: 85 ML/MIN/1.73M2
GLUCOSE SERPL-MCNC: 146 MG/DL (ref 74–109)
HCT VFR BLD AUTO: 36.7 % (ref 42–52)
HGB BLD-MCNC: 12.4 GM/DL (ref 14–18)
IMM GRANULOCYTES # BLD AUTO: 0.08 THOU/MM3 (ref 0–0.07)
IMM GRANULOCYTES NFR BLD AUTO: 0.6 %
LYMPHOCYTES ABSOLUTE: 1.4 THOU/MM3 (ref 1–4.8)
LYMPHOCYTES NFR BLD AUTO: 10.8 %
MCH RBC QN AUTO: 32.2 PG (ref 26–33)
MCHC RBC AUTO-ENTMCNC: 33.8 GM/DL (ref 32.2–35.5)
MCV RBC AUTO: 95.3 FL (ref 80–94)
MONOCYTES ABSOLUTE: 1.9 THOU/MM3 (ref 0.4–1.3)
MONOCYTES NFR BLD AUTO: 14.4 %
NEUTROPHILS ABSOLUTE: 9.4 THOU/MM3 (ref 1.8–7.7)
NEUTROPHILS NFR BLD AUTO: 71.4 %
NRBC BLD AUTO-RTO: 0 /100 WBC
PLATELET # BLD AUTO: 158 THOU/MM3 (ref 130–400)
PMV BLD AUTO: 9.8 FL (ref 9.4–12.4)
POTASSIUM SERPL-SCNC: 4.2 MEQ/L (ref 3.5–5.2)
RBC # BLD AUTO: 3.85 MILL/MM3 (ref 4.7–6.1)
SODIUM SERPL-SCNC: 134 MEQ/L (ref 135–145)
WBC # BLD AUTO: 13.1 THOU/MM3 (ref 4.8–10.8)

## 2025-07-11 PROCEDURE — 97535 SELF CARE MNGMENT TRAINING: CPT

## 2025-07-11 PROCEDURE — 6370000000 HC RX 637 (ALT 250 FOR IP): Performed by: PHYSICIAN ASSISTANT

## 2025-07-11 PROCEDURE — 6360000002 HC RX W HCPCS: Performed by: NURSE PRACTITIONER

## 2025-07-11 PROCEDURE — 80048 BASIC METABOLIC PNL TOTAL CA: CPT

## 2025-07-11 PROCEDURE — 36415 COLL VENOUS BLD VENIPUNCTURE: CPT

## 2025-07-11 PROCEDURE — 6370000000 HC RX 637 (ALT 250 FOR IP): Performed by: NURSE PRACTITIONER

## 2025-07-11 PROCEDURE — 97530 THERAPEUTIC ACTIVITIES: CPT

## 2025-07-11 PROCEDURE — 85025 COMPLETE CBC W/AUTO DIFF WBC: CPT

## 2025-07-11 RX ADMIN — PANTOPRAZOLE SODIUM 40 MG: 40 TABLET, DELAYED RELEASE ORAL at 05:04

## 2025-07-11 RX ADMIN — DOCUSATE SODIUM 100 MG: 100 CAPSULE, LIQUID FILLED ORAL at 08:01

## 2025-07-11 RX ADMIN — LISINOPRIL 10 MG: 10 TABLET ORAL at 08:01

## 2025-07-11 RX ADMIN — APIXABAN 2.5 MG: 2.5 TABLET, FILM COATED ORAL at 08:01

## 2025-07-11 RX ADMIN — CETIRIZINE HYDROCHLORIDE 5 MG: 5 TABLET ORAL at 08:01

## 2025-07-11 RX ADMIN — ATORVASTATIN CALCIUM 10 MG: 20 TABLET, FILM COATED ORAL at 08:01

## 2025-07-11 RX ADMIN — ONDANSETRON 4 MG: 2 INJECTION INTRAMUSCULAR; INTRAVENOUS at 13:18

## 2025-07-11 ASSESSMENT — PAIN SCALES - GENERAL: PAINLEVEL_OUTOF10: 2

## 2025-07-11 ASSESSMENT — PAIN DESCRIPTION - ORIENTATION: ORIENTATION: LEFT

## 2025-07-11 ASSESSMENT — PAIN DESCRIPTION - LOCATION: LOCATION: HIP;INCISION

## 2025-07-11 ASSESSMENT — PAIN DESCRIPTION - DESCRIPTORS: DESCRIPTORS: ACHING

## 2025-07-11 NOTE — PROGRESS NOTES
[facility] ORTHOPEDICS 7K  Occupational Therapy  Daily Note    Discharge Recommendations: Home with Outpatient OT  Equipment Recommendations:           Time In: 722  Time Out: 07  Timed Code Treatment Minutes: 24 Minutes  Minutes: 24          Date: 2025  Patient Name: Ric Collins,   Gender: male      Room: [facility]25/025-A  MRN: [id number]  : 1945  (80 y.o.)  Referring Practitioner: Fabby Sevilla APRN - CNP  Diagnosis: Hip fracture requiring operative repair, left, closed, initial encounter (HCC)  Additional Pertinent Hx: per chart review;  \"Ric Collins is a 80 y.o. male who presents to the emergency department from home, brought in by EMS for evaluation of mechanical fall.  The patient had a mechanical approximately 1 hour prior to arrival.  He reports this.  Mechanical in nature.  He was walking, turned too fast, and slipped over his shoe.  This resulted in a fall to his left side.  The patient denies any loss of consciousness, did not hit his head.  Does not take anticoagulation.  He was able to get off the floor due to the pain he is having in his left femur region.  His family did help him stand up, and he was able to bear weight, but he states the left pain was too severe, so he called EMS to bring him in.  The patient did not take anything prior to arrival for pain, but there is not requiring anything currently.  The family denies him being confused since the fall.  The patient does not have any prosthetic hips.  The patient is only complaining of pain in his left hip and left femur region, no other point tenderness on physical exam.  No evidence of skin abrasions anywhere.  The patient has no prodrome of chest pain/pressure/tightness or shortness of breath.  He currently does not have any of those symptoms either.  The patient has no other acute complaints at this time.\"   patient underwent a Left Total Hip Arthroplasty on

## 2025-07-11 NOTE — PLAN OF CARE
Problem: Safety - Adult  Goal: Free from fall injury  Outcome: Progressing     Problem: Discharge Planning  Goal: Discharge to home or other facility with appropriate resources  Outcome: Progressing     Problem: Pain  Goal: Verbalizes/displays adequate comfort level or baseline comfort level  Outcome: Progressing   Care plan reviewed with patient.  Patient verbalize understanding of the plan of care and contribute to goal setting.

## 2025-07-11 NOTE — PROGRESS NOTES
Orthopaedic Progress Note      SUBJECTIVE   Mr. Collins is post op day # 2 L PRISCA    Up to chair this AM  no adverse events overnight  Pain well controlled, tolerating oral diet  Denies any numbness/paresthesia in operative extremity  Good effort therapy       OBJECTIVE      Physical    VITALS:  /86   Pulse 97   Temp 97.9 °F (36.6 °C) (Oral)   Resp 18   Ht 1.829 m (6')   Wt 84.4 kg (186 lb)   SpO2 100%   BMI 25.23 kg/m²   I/O last 3 completed shifts:  In: 1880 [P.O.:1880]  Out: -     5/10 pain  Gen: alert and oriented  Head: normorcephalic, atraumatic  Resp: unlabored, room air  Pelvis: stable  LLE prineo intact, no drainage, no bandage saturation  Sensation to light touch intact  Gastroc TA EHL intact  Distal pulses palpable, warm well perfused  Calf supple nontender to palpation       Data  CBC:   Lab Results   Component Value Date/Time    WBC 14.8 07/10/2025 07:07 AM    HGB 12.9 07/10/2025 07:07 AM     07/10/2025 07:07 AM     BMP:    Lab Results   Component Value Date/Time     07/10/2025 07:07 AM    K 4.5 07/10/2025 07:07 AM     07/10/2025 07:07 AM    CO2 20 07/10/2025 07:07 AM    BUN 21 07/10/2025 07:07 AM    CREATININE 1.0 07/10/2025 07:07 AM    CALCIUM 8.7 07/10/2025 07:07 AM    GLUCOSE 168 07/10/2025 07:07 AM     Uric Acid:  No components found for: \"URIC\"  PT/INR:  No results found for: \"PROTIME\", \"INR\"  Troponin:    Lab Results   Component Value Date/Time    TROPONINI <0.006 07/05/2013 08:58 PM     Urine Culture:  No components found for: \"CURINE\"      Current Inpatient Medications    Current Facility-Administered Medications: docusate sodium (COLACE) capsule 100 mg, 100 mg, Oral, BID  senna (SENOKOT) tablet 8.6 mg, 1 tablet, Oral, Nightly  magnesium hydroxide (MILK OF MAGNESIA) 400 MG/5ML suspension 30 mL, 30 mL, Oral, Daily PRN  pantoprazole (PROTONIX) tablet 40 mg, 40 mg, Oral, QAM AC  HYDROcodone-acetaminophen (NORCO) 5-325 MG per tablet 1 tablet, 1 tablet, Oral, Q6H PRN

## 2025-07-11 NOTE — DISCHARGE SUMMARY
Physician Discharge Summary     Patient ID:  Ric Collins  [id number]  80 y.o.  1945    Admit date: 7/8/2025    Discharge date and time: 7/11/25    Admitting Physician: Dariel Marion MD     Discharge Physician: Dariel Marion MD     Admission Diagnoses: Closed fracture of left hip, initial encounter ([facility]) [S72.002A]  Fall, initial encounter [W19.XXXA]  Hip fracture requiring operative repair, left, closed, initial encounter ([facility]) [S72.002A]    Discharge Diagnoses: Closed fracture of left hip, initial encounter ([facility]) [S72.002A]  Fall, initial encounter [W19.XXXA]  Hip fracture requiring operative repair, left, closed, initial encounter ([facility]) [S72.002A]    Admission Condition: fair    Discharged Condition: fair    Indication for Admission: unstable orthopaedic injury    Hospital Course: Patient is now s/p L PRISCA on 7/9/25 by Dr. Marion. Patient presented to the ED on 7/8/25 after sustaining a fall resulting in a left femoral. Surgical intervention was recommended, patient agreeable to proceed. Hospitalist was consulted for perioperative risk assessment, medication reconciliation, and medical management HTN. Optimized from consulting services. On the day of surgery, patient was identified in the pre-operative holding area and agreeable to proceed with surgery. Written consent was obtained. Please see operative note for further details of this procedure. Patient received jacque-operative antibiotics. Patient recovered in PACU before transfer to a regular nursing floor. Patient was started on tylenol and norco for pain control and eliquis. Patient advanced diet and there were no adverse events on the floor. Patient was found to be stable and suitable for discharge with consulting services. On the day of discharge, patient was afebrile with stable vital signs, stable upon physical exam. Patient was discharged with prescriptions for pain. Patient was deemed stable for discharge to home as recommended by PT/OT. Patient

## 2025-07-11 NOTE — PROGRESS NOTES
Physician Progress Note      PATIENT:               DELORIS DONAHUE  CSN #:                  [id number]  :                       1945  ADMIT DATE:       2025 4:31 PM  DISCH DATE:  RESPONDING  PROVIDER #:        Ivana Garcia PA-C          QUERY TEXT:    Please clarify whether the Left closed displaced femoral neck fracture   documented progress notes 7/10/25 by Sabrina Franklin MD is related to a   traumatic or non-traumatic cause or following a minor injury that would not   routinely break a healthy bone:    The clinical indicators include:  presents with L lateral hip pain after a mechanical fall admitted for L   femoral neck fracture.    \"Presents with L lateral hip pain after a mechanical fall. Imaging revealed a   L femoral neck fracture. Osteopenia with diffuse multilevel spondylosis.\"(H&P   25 by Ivana Garcia)    XR femur left 25 - Left femoral neck fracture.    XR FEMUR LEFT  2025-Osteopenia.    Left Total Hip Arthroplasty, XR left hip    Thank you  Martha Weiss [facility] CDS.  Options provided:  -- Traumatic Left Femoral Neck Fracture  -- Pathological Left Femoral Neck Fracture related to osteopenia.  -- Other - I will add my own diagnosis  -- Disagree - Not applicable / Not valid  -- Disagree - Clinically unable to determine / Unknown  -- Refer to Clinical Documentation Reviewer    PROVIDER RESPONSE TEXT:    The patient has a Traumatic Left Femoral Neck Fracture.    Query created by: Martha Weiss on 2025 7:33 AM      Electronically signed by:  Ivana Garcia PA-C 2025 1:32 PM

## 2025-07-11 NOTE — PLAN OF CARE
Problem: Discharge Planning  Goal: Discharge to home or other facility with appropriate resources  7/11/2025 1131 by Judy Sahni, RN  Outcome: Completed     Problem: Pain  Goal: Verbalizes/displays adequate comfort level or baseline comfort level  7/11/2025 1131 by Judy Sahni, RN  Outcome: Completed     Problem: Safety - Adult  Goal: Free from fall injury  7/11/2025 1131 by Judy Sahni, RN  Outcome: Completed

## 2025-07-14 ENCOUNTER — CARE COORDINATION (OUTPATIENT)
Dept: CARE COORDINATION | Age: 80
End: 2025-07-14

## 2025-07-14 DIAGNOSIS — S72.002A HIP FRACTURE REQUIRING OPERATIVE REPAIR, LEFT, CLOSED, INITIAL ENCOUNTER (HCC): Primary | ICD-10-CM

## 2025-07-14 PROCEDURE — 1111F DSCHRG MED/CURRENT MED MERGE: CPT | Performed by: FAMILY MEDICINE

## 2025-07-14 NOTE — CARE COORDINATION
Care Transitions Note    Initial Call - Call within 2 business days of discharge: Yes    Patient Current Location:  Home: [address] [place] [id number]    Care Transition Nurse contacted the patient by telephone to perform post hospital discharge assessment, verified name and  as identifiers.  Provided introduction to self, and explanation of the Care Transition Nurse role.    Patient: Ric Collins    Patient : 1945   MRN: [id number]    Reason for Admission: Fall, closed fx of left hip; s/p left total hip arthoplasty  Discharge Date: 25  RURS: Readmission Risk Score: 13.2      Last Discharge Facility       Date Complaint Diagnosis Description Type Department Provider    25 Fall Closed fracture of left hip, initial encounter (HCC) ... ED to Hosp-Admission (Discharged) (ADMITTED) Dariel George MD; Beni Sherman ...            Was this an external facility discharge? No    Additional needs identified to be addressed with provider   No needs identified             Method of communication with provider: none.    Patients top risk factors for readmission: medical condition-Fall, Primary HTN    Interventions to address risk factors:   Education: discharge instructions, s/s of infection  Review of patient management of conditions/medications: s/p left total hip arthoplasty  Outpatient PT    Care Summary Note: Contacted pt for initial care transition follow up.  Ric states he is doing great.  He is up and moving around.  He is aware weight bearing as tolerated.  He is not overexerting himself or putting any pressure on left side.  He is using a walker when ambulating.  No further falls.  Reports only having slight discomfort in left hip.  Only having to take Tylenol.  Spouse states they picked up the Norco but not having to take any.  He is also icing area 20 minutes at a time.  Reports dressing is dry and intact.  Reviewed s/s of infection and when to contact Dr. Marion's office.  No s/s of

## 2025-07-15 SDOH — HEALTH STABILITY: PHYSICAL HEALTH: ON AVERAGE, HOW MANY DAYS PER WEEK DO YOU ENGAGE IN MODERATE TO STRENUOUS EXERCISE (LIKE A BRISK WALK)?: 6 DAYS

## 2025-07-15 SDOH — HEALTH STABILITY: PHYSICAL HEALTH: ON AVERAGE, HOW MANY MINUTES DO YOU ENGAGE IN EXERCISE AT THIS LEVEL?: 30 MIN

## 2025-07-15 ASSESSMENT — PATIENT HEALTH QUESTIONNAIRE - PHQ9
SUM OF ALL RESPONSES TO PHQ QUESTIONS 1-9: 0
1. LITTLE INTEREST OR PLEASURE IN DOING THINGS: NOT AT ALL
2. FEELING DOWN, DEPRESSED OR HOPELESS: NOT AT ALL

## 2025-07-15 ASSESSMENT — LIFESTYLE VARIABLES
HOW OFTEN DO YOU HAVE A DRINK CONTAINING ALCOHOL: NEVER
HOW OFTEN DO YOU HAVE SIX OR MORE DRINKS ON ONE OCCASION: 1
HOW MANY STANDARD DRINKS CONTAINING ALCOHOL DO YOU HAVE ON A TYPICAL DAY: 0
HOW MANY STANDARD DRINKS CONTAINING ALCOHOL DO YOU HAVE ON A TYPICAL DAY: PATIENT DOES NOT DRINK
HOW OFTEN DO YOU HAVE A DRINK CONTAINING ALCOHOL: 1

## 2025-07-16 ENCOUNTER — OFFICE VISIT (OUTPATIENT)
Dept: FAMILY MEDICINE CLINIC | Age: 80
End: 2025-07-16
Payer: MEDICARE

## 2025-07-16 VITALS
HEIGHT: 72 IN | BODY MASS INDEX: 26.21 KG/M2 | WEIGHT: 193.5 LBS | DIASTOLIC BLOOD PRESSURE: 64 MMHG | SYSTOLIC BLOOD PRESSURE: 104 MMHG | HEART RATE: 67 BPM | OXYGEN SATURATION: 100 %

## 2025-07-16 DIAGNOSIS — I10 PRIMARY HYPERTENSION: ICD-10-CM

## 2025-07-16 DIAGNOSIS — Z96.642 STATUS POST LEFT HIP REPLACEMENT: Primary | ICD-10-CM

## 2025-07-16 DIAGNOSIS — R01.1 SYSTOLIC MURMUR: ICD-10-CM

## 2025-07-16 DIAGNOSIS — Z85.46 HISTORY OF PROSTATE CANCER: ICD-10-CM

## 2025-07-16 PROBLEM — S72.002A CLOSED FRACTURE OF LEFT HIP (HCC): Status: RESOLVED | Noted: 2025-07-09 | Resolved: 2025-07-16

## 2025-07-16 PROBLEM — S72.002A HIP FRACTURE REQUIRING OPERATIVE REPAIR, LEFT, CLOSED, INITIAL ENCOUNTER (HCC): Status: RESOLVED | Noted: 2025-07-08 | Resolved: 2025-07-16

## 2025-07-16 PROBLEM — R82.89 ABNORMAL URINE CYTOLOGY: Status: RESOLVED | Noted: 2020-11-23 | Resolved: 2025-07-16

## 2025-07-16 PROBLEM — Z01.818 PREOPERATIVE CLEARANCE: Status: RESOLVED | Noted: 2025-07-09 | Resolved: 2025-07-16
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
## 2025-07-16 PROCEDURE — 1159F MED LIST DOCD IN RCRD: CPT | Performed by: NURSE PRACTITIONER

## 2025-07-16 PROCEDURE — 3074F SYST BP LT 130 MM HG: CPT | Performed by: NURSE PRACTITIONER

## 2025-07-16 PROCEDURE — 99204 OFFICE O/P NEW MOD 45 MIN: CPT | Performed by: NURSE PRACTITIONER

## 2025-07-16 PROCEDURE — 1123F ACP DISCUSS/DSCN MKR DOCD: CPT | Performed by: NURSE PRACTITIONER

## 2025-07-16 PROCEDURE — 3078F DIAST BP <80 MM HG: CPT | Performed by: NURSE PRACTITIONER

## 2025-07-16 ASSESSMENT — ENCOUNTER SYMPTOMS
ABDOMINAL PAIN: 0
BACK PAIN: 0
TROUBLE SWALLOWING: 0
DIARRHEA: 0
ALLERGIC/IMMUNOLOGIC NEGATIVE: 1
EYE DISCHARGE: 0
SHORTNESS OF BREATH: 0
CONSTIPATION: 0
SORE THROAT: 0
VOMITING: 0
EYE REDNESS: 0
COUGH: 0
WHEEZING: 0
RHINORRHEA: 0
NAUSEA: 0
EYE PAIN: 0

## 2025-07-16 NOTE — PROGRESS NOTES
[facility]  [address] [id number]  Dept: [phone]  Dept Fax: [phone]  Loc: [phone]     Visit Date:  7/16/2025      Patient:  Ric Collins  YOB: 1945    HPI:     Chief Complaint   Patient presents with    New Patient     Had medicare AWV around May with previous PCP Dariel Sarabia. Here to establish care       HPI  History of Present Illness  The patient presents for evaluation of hypertension and status post left hip replacement.    He is currently one week post-surgery from a left hip replacement, which was performed on 07/09/2025 following a fall on 07/08/2025. He has been managing his pain with Tylenol, having only taken Norco on the first night post-surgery while in the hospital. He has not required any other pain medication since then. He has been taking Colace to maintain regular bowel movements post-surgery. Increased water intake recently to prevent constipation has resulted in frequent urination at night. He reports good sleep quality and a healthy appetite. He is currently on Eliquis as a prophylactic measure following his surgery, with a planned course of 30 days. He has not used muscle relaxers for the past 3 days.    He has a history of hypertension, with his blood pressure typically around 125/70. Upon discharge from the hospital on 07/11/2025, his blood pressure was recorded as 111/70. He has been on lisinopril since approximately 2008 or 2009.    He underwent a radical prostatectomy in 2012 for prostate cancer, with no recurrence reported. He had meniscus surgery for a torn meniscus about 8 or 9 years ago. He has never had a heart attack. He has had 1 or 2 colonoscopies in the past. He has had a murmur since he was young.    Social History:  Occupations: Teacher, retired 5 years ago  Tobacco: Smoked for 4 years, quit in 1968  Sleep: Reports good sleep quality, frequent urination at night due

## 2025-07-17 ENCOUNTER — CARE COORDINATION (OUTPATIENT)
Dept: CARE COORDINATION | Age: 80
End: 2025-07-17

## 2025-07-17 NOTE — CARE COORDINATION
Care Transitions Note    Follow Up Call     Attempted to reach patient for transitions of care follow up.  Unable to reach patient.      Outreach Attempts:   HIPAA compliant voicemail left for patient.     Care Summary Note: Called [phone], left vm at 14:36    Follow Up Appointment:   Future Appointments         Provider Specialty Dept Phone    1/19/2026 11:00 AM Jose Maria Cruz APRN - CNP Family Medicine [phone]            Plan for follow-up on next business day.  based on severity of symptoms and risk factors. Plan for next call:     Zofia Ron LPN

## 2025-07-18 ENCOUNTER — CARE COORDINATION (OUTPATIENT)
Dept: CARE COORDINATION | Age: 80
End: 2025-07-18

## 2025-07-18 NOTE — CARE COORDINATION
Care Transitions Note    Follow Up Call     Attempted to reach patient for transitions of care follow up.  Unable to reach patient.      Outreach Attempts:   HIPAA compliant voicemail left for patient.     Care Summary Note: 2nd attempt to contact pt for care transition follow up.  Unable to reach pt.  Left message with contact information and request for call back.      Program will be closed and CTN to sign off if return call is not received from the patient.      Follow Up Appointment:   Future Appointments         Provider Specialty Dept Phone    1/19/2026 11:00 AM Jose Maria Cruz, JOSE A - CNP Family Medicine [phone]            No further follow-up call indicated based on severity of symptoms and risk factors.     Michelle Waller RN

## 2025-08-03 ENCOUNTER — HOSPITAL ENCOUNTER (EMERGENCY)
Age: 80
Discharge: HOME | End: 2025-08-03
Payer: MEDICARE

## 2025-08-03 VITALS
HEART RATE: 84 BPM | WEIGHT: 186 LBS | OXYGEN SATURATION: 95 % | RESPIRATION RATE: 16 BRPM | TEMPERATURE: 97.6 F | DIASTOLIC BLOOD PRESSURE: 82 MMHG | HEIGHT: 72 IN | BODY MASS INDEX: 25.19 KG/M2 | SYSTOLIC BLOOD PRESSURE: 147 MMHG

## 2025-08-03 DIAGNOSIS — R19.7 DIARRHEA, UNSPECIFIED TYPE: Primary | ICD-10-CM

## 2025-08-03 PROCEDURE — 99212 OFFICE O/P EST SF 10 MIN: CPT | Performed by: NURSE PRACTITIONER

## 2025-08-03 PROCEDURE — 99213 OFFICE O/P EST LOW 20 MIN: CPT

## 2025-08-03 ASSESSMENT — PAIN - FUNCTIONAL ASSESSMENT: PAIN_FUNCTIONAL_ASSESSMENT: NONE - DENIES PAIN

## 2025-08-07 ENCOUNTER — OFFICE VISIT (OUTPATIENT)
Dept: FAMILY MEDICINE CLINIC | Age: 80
End: 2025-08-07

## 2025-08-07 VITALS
RESPIRATION RATE: 18 BRPM | DIASTOLIC BLOOD PRESSURE: 84 MMHG | OXYGEN SATURATION: 98 % | HEART RATE: 86 BPM | TEMPERATURE: 97 F | WEIGHT: 184.3 LBS | BODY MASS INDEX: 25 KG/M2 | SYSTOLIC BLOOD PRESSURE: 128 MMHG

## 2025-08-07 DIAGNOSIS — K59.00 CONSTIPATION, UNSPECIFIED CONSTIPATION TYPE: Primary | ICD-10-CM

## 2025-08-07 RX ORDER — LISINOPRIL 10 MG/1
10 TABLET ORAL DAILY
Qty: 90 TABLET | Refills: 3 | Status: SHIPPED | OUTPATIENT
Start: 2025-08-07 | End: 2026-08-02

## 2025-08-07 ASSESSMENT — ENCOUNTER SYMPTOMS
SORE THROAT: 0
WHEEZING: 0
CONSTIPATION: 1
RHINORRHEA: 0
EYE DISCHARGE: 0
EYE PAIN: 0
NAUSEA: 0
SHORTNESS OF BREATH: 0
BACK PAIN: 0
ALLERGIC/IMMUNOLOGIC NEGATIVE: 1
VOMITING: 0
ABDOMINAL PAIN: 0
COUGH: 0
TROUBLE SWALLOWING: 0
DIARRHEA: 0
EYE REDNESS: 0

## (undated) DEVICE — BLADE ES L6IN ELASTOMERIC COAT EXT DURABLE BEND UPTO 90DEG

## (undated) DEVICE — Device

## (undated) DEVICE — PACK-MAJOR

## (undated) DEVICE — SUTURE ETHIBOND EXCEL SZ 5 L30IN NONABSORBABLE GRN L48MM V-40 MB46G

## (undated) DEVICE — PAD HIP IMMOB

## (undated) DEVICE — GLOVE PROTCT PF XL ANTIMICROBIAL A4 CUT RESIST SCEPTER LTX

## (undated) DEVICE — SUTURE VICRYL SZ 1 L36IN ABSRB UD CTX L48MM 1/2 CIR J977H

## (undated) DEVICE — 450 ML BOTTLE OF 0.05% CHLORHEXIDINE GLUCONATE IN 99.95% STERILE WATER FOR IRRIGATION, USP AND APPLICATOR.: Brand: IRRISEPT ANTIMICROBIAL WOUND LAVAGE

## (undated) DEVICE — SUTURE ABSORBABLE MONOFILAMENT 1 CTX 36 CM 48 MM VIO PDS +

## (undated) DEVICE — DUAL CUT SAGITTAL BLADE

## (undated) DEVICE — SUTURE STRATAFIX SZ 3-0 30CM NONABSORB UD 26MM FS 3/8 SXMP2B412

## (undated) DEVICE — GOWN,SIRUS,NONRNF,SETINSLV,XL,20/CS: Brand: MEDLINE

## (undated) DEVICE — BANDAGE COMPR W6INXL5YD SELF ADH COHESIVE CO FLX

## (undated) DEVICE — HOOD WITH PEEL AWAY FACE SHIELD: Brand: T7PLUS

## (undated) DEVICE — COVER,TABLE,44X90,STERILE: Brand: MEDLINE

## (undated) DEVICE — ADHESIVE SKIN CLOSURE WND 8.661X1.5 IN 22 CM LIQUIBAND SECUR